# Patient Record
Sex: FEMALE | Race: BLACK OR AFRICAN AMERICAN | NOT HISPANIC OR LATINO | Employment: UNEMPLOYED | URBAN - METROPOLITAN AREA
[De-identification: names, ages, dates, MRNs, and addresses within clinical notes are randomized per-mention and may not be internally consistent; named-entity substitution may affect disease eponyms.]

---

## 2019-08-13 ENCOUNTER — HOSPITAL ENCOUNTER (EMERGENCY)
Dept: CARDIOLOGY | Facility: HOSPITAL | Age: 59
Discharge: HOME | End: 2019-08-13

## 2019-08-13 ENCOUNTER — APPOINTMENT (OUTPATIENT)
Dept: GENERAL RADIOLOGY | Facility: HOSPITAL | Age: 59
End: 2019-08-13

## 2019-08-13 ENCOUNTER — HOSPITAL ENCOUNTER (EMERGENCY)
Facility: HOSPITAL | Age: 59
Discharge: HOME | End: 2019-08-13
Admitting: EMERGENCY MEDICINE

## 2019-08-13 VITALS
WEIGHT: 251.83 LBS | BODY MASS INDEX: 44.62 KG/M2 | HEIGHT: 63 IN | DIASTOLIC BLOOD PRESSURE: 79 MMHG | SYSTOLIC BLOOD PRESSURE: 132 MMHG | TEMPERATURE: 98.2 F | HEART RATE: 78 BPM | OXYGEN SATURATION: 97 % | RESPIRATION RATE: 16 BRPM

## 2019-08-13 DIAGNOSIS — M79.605 LEFT LEG PAIN: Primary | ICD-10-CM

## 2019-08-13 LAB
BH CV LOWER VASCULAR LEFT COMMON FEMORAL AUGMENT: NORMAL
BH CV LOWER VASCULAR LEFT COMMON FEMORAL COMPETENT: NORMAL
BH CV LOWER VASCULAR LEFT COMMON FEMORAL COMPRESS: NORMAL
BH CV LOWER VASCULAR LEFT COMMON FEMORAL PHASIC: NORMAL
BH CV LOWER VASCULAR LEFT COMMON FEMORAL SPONT: NORMAL
BH CV LOWER VASCULAR LEFT DISTAL FEMORAL COMPRESS: NORMAL
BH CV LOWER VASCULAR LEFT GASTRONEMIUS COMPRESS: NORMAL
BH CV LOWER VASCULAR LEFT GREATER SAPH AK COMPRESS: NORMAL
BH CV LOWER VASCULAR LEFT GREATER SAPH BK COMPRESS: NORMAL
BH CV LOWER VASCULAR LEFT LESSER SAPH COMPRESS: NORMAL
BH CV LOWER VASCULAR LEFT MID FEMORAL AUGMENT: NORMAL
BH CV LOWER VASCULAR LEFT MID FEMORAL COMPETENT: NORMAL
BH CV LOWER VASCULAR LEFT MID FEMORAL COMPRESS: NORMAL
BH CV LOWER VASCULAR LEFT MID FEMORAL PHASIC: NORMAL
BH CV LOWER VASCULAR LEFT MID FEMORAL SPONT: NORMAL
BH CV LOWER VASCULAR LEFT PERONEAL COMPRESS: NORMAL
BH CV LOWER VASCULAR LEFT POPLITEAL AUGMENT: NORMAL
BH CV LOWER VASCULAR LEFT POPLITEAL COMPETENT: NORMAL
BH CV LOWER VASCULAR LEFT POPLITEAL COMPRESS: NORMAL
BH CV LOWER VASCULAR LEFT POPLITEAL PHASIC: NORMAL
BH CV LOWER VASCULAR LEFT POPLITEAL SPONT: NORMAL
BH CV LOWER VASCULAR LEFT POSTERIOR TIBIAL COMPRESS: NORMAL
BH CV LOWER VASCULAR LEFT PROXIMAL FEMORAL COMPRESS: NORMAL
BH CV LOWER VASCULAR LEFT SAPHENOFEMORAL JUNCTION COMPRESS: NORMAL
BH CV LOWER VASCULAR RIGHT COMMON FEMORAL AUGMENT: NORMAL
BH CV LOWER VASCULAR RIGHT COMMON FEMORAL COMPETENT: NORMAL
BH CV LOWER VASCULAR RIGHT COMMON FEMORAL COMPRESS: NORMAL
BH CV LOWER VASCULAR RIGHT COMMON FEMORAL PHASIC: NORMAL
BH CV LOWER VASCULAR RIGHT COMMON FEMORAL SPONT: NORMAL

## 2019-08-13 PROCEDURE — 73590 X-RAY EXAM OF LOWER LEG: CPT

## 2019-08-13 PROCEDURE — 99284 EMERGENCY DEPT VISIT MOD MDM: CPT

## 2019-08-13 PROCEDURE — 93971 EXTREMITY STUDY: CPT

## 2019-08-13 PROCEDURE — 73564 X-RAY EXAM KNEE 4 OR MORE: CPT

## 2019-08-13 RX ORDER — LEVOTHYROXINE SODIUM 0.1 MG/1
100 TABLET ORAL DAILY
COMMUNITY

## 2019-08-13 RX ORDER — IBUPROFEN 400 MG/1
800 TABLET ORAL ONCE
Status: COMPLETED | OUTPATIENT
Start: 2019-08-13 | End: 2019-08-13

## 2019-08-13 RX ADMIN — IBUPROFEN 800 MG: 400 TABLET ORAL at 08:58

## 2019-08-13 NOTE — DISCHARGE INSTRUCTIONS
Continue current home medications.  Use ibuprofen as needed for pain.  Ace wrap.  Follow-up with primary care physician.  Return for new or worsening symptoms.

## 2019-08-13 NOTE — ED PROVIDER NOTES
Subjective   Patient is a 58-year-old -American female with history of thyroid disorder who presents today with complaints of pain in her left lower leg.  She states this started approximately 2 weeks ago with no known injury or trauma.  She states that the majority of her pain is in the anterior aspect of her leg just below the knee.  She states she is also had some swelling to this area.  She also complains of some pain and tenderness along the inner aspect of her left leg and calf.  She denies any erythema.  Denies any numbness tingling or weakness.  She denies fever or chills.            Review of Systems   Constitutional: Negative for chills and fever.   Musculoskeletal:        Left lower leg pain and swelling   Neurological: Negative for weakness and numbness.       Past Medical History:   Diagnosis Date   • Cancer (CMS/HCC)     Ovarian Tumors removed       No Known Allergies    History reviewed. No pertinent surgical history.    History reviewed. No pertinent family history.    Social History     Socioeconomic History   • Marital status:      Spouse name: Not on file   • Number of children: Not on file   • Years of education: Not on file   • Highest education level: Not on file   Tobacco Use   • Smoking status: Never Smoker   Substance and Sexual Activity   • Alcohol use: No     Frequency: Never   • Drug use: No   • Sexual activity: Defer           Objective   Physical Exam   Constitutional: She appears well-developed.   Vital signs and triage nurse note reviewed.  Constitutional: Awake, alert; well-developed and well-nourished. No acute distress is noted.  Cardiovascular: Regular rate and rhythm  Pulmonary: Respiratory effort regular nonlabored  Musculoskeletal: Independent range of motion of all extremities.  There is tenderness noted over the anterior aspect of the left lower leg just below the knee.  There is mild edema noted.  There is no underlying erythema.  No open wound.  No crepitus.   There is also some tenderness noted along the inner aspect of the left lower leg and down to the calf.  There is no underlying erythema or edema.  Negative Hannah.  Distal neurovascular motor intact.  Neuro: Alert oriented x3, speech is clear and appropriate, GCS 15.    Skin: Flesh tone, warm, dry, intact; no erythematous or petechial rash or lesion.        Procedures           ED Course      Labs Reviewed - No data to display  Xr Knee 4+ View Left    Result Date: 8/13/2019   1. Mild soft tissue swelling anterior and lateral to the knee and lower leg, with no radiographic evidence of acute fracture or dislocation of the left knee or left lower leg. 2. 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  Electronically Signed By-Hemanth May On:8/13/2019 10:07 AM This report was finalized on [id number] by  Hemanth May, .    Xr Tibia Fibula 2 View Left    Result Date: 8/13/2019   1. Mild soft tissue swelling anterior and lateral to the knee and lower leg, with no radiographic evidence of acute fracture or dislocation of the left knee or left lower leg. 2. 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  Electronically Signed By-Hemanth May On:8/13/2019 10:07 AM This report was finalized on [id number] by  Hemanth May, .    Medications   ibuprofen (ADVIL,MOTRIN) tablet 800 mg (800 mg Oral Given 8/13/19 0858)                 MDM  Number of Diagnoses or Management Options  Left leg pain:      Amount and/or Complexity of Data Reviewed  Tests in the radiology section of CPT®: ordered and reviewed  Independent visualization of images, tracings, or specimens: yes    Risk of Complications, Morbidity, and/or Mortality  General comments: Comorbidities: Thyroid disorder  Differentials: Contusion,  Cyst, effusion, DVT;this list is not all inclusive and does not constitute the entirety of considered causes    Patient had  x-rays and venous Doppler obtained of the left leg that was found to be negative for DVT or SVT.  She is given ibuprofen for pain.    The patient had a Ace wrap applied.  Distal motor, sensory and vascular status intact after application.     Diagnosis and treatment plan discussed with patient.  Patient agreeable to plan.   I discussed findings with patient who voices understanding of discharge instructions, signs and symptoms requiring return to ED; discharged improved and in stable condition with follow up for re-evaluation.        Patient Progress  Patient progress: stable        Final diagnoses:   Left leg pain            Chelsea Maya APRN  08/13/19 1231       Chelsea Maya APRN  08/13/19 1238

## 2019-08-13 NOTE — ED NOTES
Pt c/o left knee pain starting 2 weeks ago. Pt denies any injury. Pulses are good upon assessment with no discoloration or noticeable edema. Knee is tender to touch.      Prior, RAYO Irving  08/13/19 0847

## 2019-08-19 ENCOUNTER — TRANSCRIBE ORDERS (OUTPATIENT)
Dept: ADMINISTRATIVE | Facility: HOSPITAL | Age: 59
End: 2019-08-19

## 2019-08-19 DIAGNOSIS — Z12.31 SCREENING MAMMOGRAM, ENCOUNTER FOR: Primary | ICD-10-CM

## 2019-08-27 ENCOUNTER — HOSPITAL ENCOUNTER (OUTPATIENT)
Dept: OTHER | Facility: HOSPITAL | Age: 59
Discharge: HOME | End: 2019-08-27

## 2019-08-27 DIAGNOSIS — Z00.6 EXAMINATION FOR NORMAL COMPARISON OR CONTROL IN CLINICAL RESEARCH: ICD-10-CM

## 2021-12-18 ENCOUNTER — HOSPITAL ENCOUNTER (EMERGENCY)
Facility: HOSPITAL | Age: 61
Discharge: HOME | End: 2021-12-18
Attending: EMERGENCY MEDICINE | Admitting: EMERGENCY MEDICINE

## 2021-12-18 ENCOUNTER — APPOINTMENT (OUTPATIENT)
Dept: GENERAL RADIOLOGY | Facility: HOSPITAL | Age: 61
End: 2021-12-18

## 2021-12-18 VITALS
HEIGHT: 64 IN | TEMPERATURE: 98.4 F | OXYGEN SATURATION: 95 % | RESPIRATION RATE: 16 BRPM | HEART RATE: 92 BPM | BODY MASS INDEX: 40.97 KG/M2 | SYSTOLIC BLOOD PRESSURE: 124 MMHG | DIASTOLIC BLOOD PRESSURE: 81 MMHG | WEIGHT: 240 LBS

## 2021-12-18 DIAGNOSIS — N39.0 URINARY TRACT INFECTION WITHOUT HEMATURIA, SITE UNSPECIFIED: ICD-10-CM

## 2021-12-18 DIAGNOSIS — J12.82 PNEUMONIA DUE TO COVID-19 VIRUS: Primary | ICD-10-CM

## 2021-12-18 DIAGNOSIS — U07.1 PNEUMONIA DUE TO COVID-19 VIRUS: Primary | ICD-10-CM

## 2021-12-18 LAB
ALBUMIN SERPL-MCNC: 3.4 G/DL (ref 3.5–5.2)
ALBUMIN/GLOB SERPL: 1 G/DL
ALP SERPL-CCNC: 74 U/L (ref 39–117)
ALT SERPL W P-5'-P-CCNC: 23 U/L (ref 1–33)
ANION GAP SERPL CALCULATED.3IONS-SCNC: 13 MMOL/L (ref 5–15)
AST SERPL-CCNC: 28 U/L (ref 1–32)
BACTERIA UR QL AUTO: ABNORMAL /HPF
BASOPHILS # BLD AUTO: 0 10*3/MM3 (ref 0–0.2)
BASOPHILS NFR BLD AUTO: 0.6 % (ref 0–1.5)
BILIRUB SERPL-MCNC: 0.5 MG/DL (ref 0–1.2)
BILIRUB UR QL STRIP: ABNORMAL
BUN SERPL-MCNC: 11 MG/DL (ref 8–23)
BUN/CREAT SERPL: 12.9 (ref 7–25)
CALCIUM SPEC-SCNC: 9 MG/DL (ref 8.6–10.5)
CHLORIDE SERPL-SCNC: 102 MMOL/L (ref 98–107)
CLARITY UR: ABNORMAL
CO2 SERPL-SCNC: 25 MMOL/L (ref 22–29)
COLOR UR: ABNORMAL
CREAT SERPL-MCNC: 0.85 MG/DL (ref 0.57–1)
D-LACTATE SERPL-SCNC: 1.4 MMOL/L (ref 0.5–2)
DEPRECATED RDW RBC AUTO: 44.2 FL (ref 37–54)
EOSINOPHIL # BLD AUTO: 0 10*3/MM3 (ref 0–0.4)
EOSINOPHIL NFR BLD AUTO: 0.6 % (ref 0.3–6.2)
ERYTHROCYTE [DISTWIDTH] IN BLOOD BY AUTOMATED COUNT: 13.1 % (ref 12.3–15.4)
FLUAV SUBTYP SPEC NAA+PROBE: NOT DETECTED
FLUBV RNA ISLT QL NAA+PROBE: NOT DETECTED
GFR SERPL CREATININE-BSD FRML MDRD: 82 ML/MIN/1.73
GLOBULIN UR ELPH-MCNC: 3.5 GM/DL
GLUCOSE SERPL-MCNC: 97 MG/DL (ref 65–99)
GLUCOSE UR STRIP-MCNC: NEGATIVE MG/DL
HCT VFR BLD AUTO: 39.3 % (ref 34–46.6)
HGB BLD-MCNC: 13.4 G/DL (ref 12–15.9)
HGB UR QL STRIP.AUTO: ABNORMAL
HOLD SPECIMEN: NORMAL
HYALINE CASTS UR QL AUTO: ABNORMAL /LPF
KETONES UR QL STRIP: ABNORMAL
LEUKOCYTE ESTERASE UR QL STRIP.AUTO: ABNORMAL
LIPASE SERPL-CCNC: 20 U/L (ref 13–60)
LYMPHOCYTES # BLD AUTO: 1.4 10*3/MM3 (ref 0.7–3.1)
LYMPHOCYTES NFR BLD AUTO: 21.3 % (ref 19.6–45.3)
MCH RBC QN AUTO: 32.8 PG (ref 26.6–33)
MCHC RBC AUTO-ENTMCNC: 34.2 G/DL (ref 31.5–35.7)
MCV RBC AUTO: 96.1 FL (ref 79–97)
MONOCYTES # BLD AUTO: 0.3 10*3/MM3 (ref 0.1–0.9)
MONOCYTES NFR BLD AUTO: 4.9 % (ref 5–12)
MUCOUS THREADS URNS QL MICRO: ABNORMAL /HPF
NEUTROPHILS NFR BLD AUTO: 4.7 10*3/MM3 (ref 1.7–7)
NEUTROPHILS NFR BLD AUTO: 72.6 % (ref 42.7–76)
NITRITE UR QL STRIP: NEGATIVE
NRBC BLD AUTO-RTO: 0 /100 WBC (ref 0–0.2)
PH UR STRIP.AUTO: 8.5 [PH] (ref 5–8)
PLATELET # BLD AUTO: 287 10*3/MM3 (ref 140–450)
PMV BLD AUTO: 7.7 FL (ref 6–12)
POTASSIUM SERPL-SCNC: 4.1 MMOL/L (ref 3.5–5.2)
PROT SERPL-MCNC: 6.9 G/DL (ref 6–8.5)
PROT UR QL STRIP: ABNORMAL
RBC # BLD AUTO: 4.08 10*6/MM3 (ref 3.77–5.28)
RBC # UR STRIP: ABNORMAL /HPF
REF LAB TEST METHOD: ABNORMAL
SARS-COV-2 RNA PNL SPEC NAA+PROBE: DETECTED
SODIUM SERPL-SCNC: 140 MMOL/L (ref 136–145)
SP GR UR STRIP: 1.02 (ref 1–1.03)
SQUAMOUS #/AREA URNS HPF: ABNORMAL /HPF
TRICHOMONAS #/AREA URNS HPF: ABNORMAL /HPF
UROBILINOGEN UR QL STRIP: ABNORMAL
WBC # UR STRIP: ABNORMAL /HPF
WBC NRBC COR # BLD: 6.5 10*3/MM3 (ref 3.4–10.8)
WHOLE BLOOD HOLD SPECIMEN: NORMAL

## 2021-12-18 PROCEDURE — 25010000002 CEFTRIAXONE PER 250 MG: Performed by: EMERGENCY MEDICINE

## 2021-12-18 PROCEDURE — 87635 SARS-COV-2 COVID-19 AMP PRB: CPT | Performed by: EMERGENCY MEDICINE

## 2021-12-18 PROCEDURE — 25010000002 ONDANSETRON PER 1 MG: Performed by: EMERGENCY MEDICINE

## 2021-12-18 PROCEDURE — 80053 COMPREHEN METABOLIC PANEL: CPT | Performed by: EMERGENCY MEDICINE

## 2021-12-18 PROCEDURE — 87040 BLOOD CULTURE FOR BACTERIA: CPT | Performed by: EMERGENCY MEDICINE

## 2021-12-18 PROCEDURE — 87086 URINE CULTURE/COLONY COUNT: CPT | Performed by: EMERGENCY MEDICINE

## 2021-12-18 PROCEDURE — 25010000002 INJECTION, CASIRIVIMAB AND IMDEVIMAB, 1200 MG: Performed by: EMERGENCY MEDICINE

## 2021-12-18 PROCEDURE — 85025 COMPLETE CBC W/AUTO DIFF WBC: CPT | Performed by: EMERGENCY MEDICINE

## 2021-12-18 PROCEDURE — 87502 INFLUENZA DNA AMP PROBE: CPT | Performed by: EMERGENCY MEDICINE

## 2021-12-18 PROCEDURE — 96374 THER/PROPH/DIAG INJ IV PUSH: CPT

## 2021-12-18 PROCEDURE — M0243 CASIRIVI AND IMDEVI INFUSION: HCPCS | Performed by: EMERGENCY MEDICINE

## 2021-12-18 PROCEDURE — 96375 TX/PRO/DX INJ NEW DRUG ADDON: CPT

## 2021-12-18 PROCEDURE — 71045 X-RAY EXAM CHEST 1 VIEW: CPT

## 2021-12-18 PROCEDURE — 83690 ASSAY OF LIPASE: CPT | Performed by: EMERGENCY MEDICINE

## 2021-12-18 PROCEDURE — 83605 ASSAY OF LACTIC ACID: CPT

## 2021-12-18 PROCEDURE — 99283 EMERGENCY DEPT VISIT LOW MDM: CPT

## 2021-12-18 PROCEDURE — 81001 URINALYSIS AUTO W/SCOPE: CPT | Performed by: EMERGENCY MEDICINE

## 2021-12-18 RX ORDER — ACETAMINOPHEN 500 MG
1000 TABLET ORAL ONCE
Status: COMPLETED | OUTPATIENT
Start: 2021-12-18 | End: 2021-12-18

## 2021-12-18 RX ORDER — ONDANSETRON 4 MG/1
4 TABLET, ORALLY DISINTEGRATING ORAL EVERY 6 HOURS PRN
Qty: 12 TABLET | Refills: 0 | OUTPATIENT
Start: 2021-12-18 | End: 2022-01-19

## 2021-12-18 RX ORDER — DIPHENHYDRAMINE HCL 25 MG
50 CAPSULE ORAL ONCE AS NEEDED
Status: DISCONTINUED | OUTPATIENT
Start: 2021-12-18 | End: 2021-12-18 | Stop reason: HOSPADM

## 2021-12-18 RX ORDER — METHYLPREDNISOLONE SODIUM SUCCINATE 125 MG/2ML
125 INJECTION, POWDER, LYOPHILIZED, FOR SOLUTION INTRAMUSCULAR; INTRAVENOUS ONCE AS NEEDED
Status: DISCONTINUED | OUTPATIENT
Start: 2021-12-18 | End: 2021-12-18 | Stop reason: HOSPADM

## 2021-12-18 RX ORDER — EPINEPHRINE 1 MG/ML
0.3 INJECTION, SOLUTION, CONCENTRATE INTRAVENOUS ONCE AS NEEDED
Status: DISCONTINUED | OUTPATIENT
Start: 2021-12-18 | End: 2021-12-18 | Stop reason: HOSPADM

## 2021-12-18 RX ORDER — CEFDINIR 300 MG/1
300 CAPSULE ORAL 2 TIMES DAILY
Qty: 14 CAPSULE | Refills: 0 | OUTPATIENT
Start: 2021-12-18 | End: 2022-01-19

## 2021-12-18 RX ORDER — DIPHENHYDRAMINE HYDROCHLORIDE 50 MG/ML
50 INJECTION INTRAMUSCULAR; INTRAVENOUS ONCE AS NEEDED
Status: DISCONTINUED | OUTPATIENT
Start: 2021-12-18 | End: 2021-12-18 | Stop reason: HOSPADM

## 2021-12-18 RX ORDER — SODIUM CHLORIDE 0.9 % (FLUSH) 0.9 %
10 SYRINGE (ML) INJECTION AS NEEDED
Status: DISCONTINUED | OUTPATIENT
Start: 2021-12-18 | End: 2021-12-18 | Stop reason: HOSPADM

## 2021-12-18 RX ORDER — ONDANSETRON 2 MG/ML
4 INJECTION INTRAMUSCULAR; INTRAVENOUS ONCE
Status: COMPLETED | OUTPATIENT
Start: 2021-12-18 | End: 2021-12-18

## 2021-12-18 RX ORDER — SODIUM CHLORIDE 9 MG/ML
30 INJECTION, SOLUTION INTRAVENOUS ONCE
Status: COMPLETED | OUTPATIENT
Start: 2021-12-18 | End: 2021-12-18

## 2021-12-18 RX ADMIN — WATER 2 G: 100 INJECTION, SOLUTION INTRAVENOUS at 19:32

## 2021-12-18 RX ADMIN — ONDANSETRON 4 MG: 2 INJECTION INTRAMUSCULAR; INTRAVENOUS at 17:06

## 2021-12-18 RX ADMIN — IMDEVIMAB: 1332 INJECTION, SOLUTION, CONCENTRATE INTRAVENOUS at 19:55

## 2021-12-18 RX ADMIN — SODIUM CHLORIDE, POTASSIUM CHLORIDE, SODIUM LACTATE AND CALCIUM CHLORIDE 1000 ML: 600; 310; 30; 20 INJECTION, SOLUTION INTRAVENOUS at 17:12

## 2021-12-18 RX ADMIN — ACETAMINOPHEN 1000 MG: 500 TABLET, FILM COATED ORAL at 17:06

## 2021-12-18 RX ADMIN — SODIUM CHLORIDE 30 ML: 9 INJECTION, SOLUTION INTRAVENOUS at 20:14

## 2021-12-18 NOTE — ED PROVIDER NOTES
Subjective   Chief complaint headache earache cough congestion nausea vomiting    History present 61-year-old female who states that she has had about a 5-day history of some headache ear ache congestion sore throat cough and over last 24 hours and started vomiting and diarrhea.  Patient with low-grade temperature and she has had chills.  No other ill exposures or foreign travels antibiotic use or recent hospitalization.  No dysuria or frequency.  Patient no leg pain or swelling no rashes.  Nothing makes it better nothing makes it worse continuous 5 days associated above symptoms.  Patient is not vaccinated against Covid          Review of Systems   Constitutional: Positive for chills and fever.   HENT: Positive for congestion and ear pain.    Eyes: Negative for photophobia and visual disturbance.   Respiratory: Positive for cough. Negative for chest tightness and shortness of breath.    Cardiovascular: Negative for chest pain and leg swelling.   Gastrointestinal: Positive for vomiting. Negative for abdominal pain.   Endocrine: Negative for cold intolerance and heat intolerance.   Genitourinary: Negative for difficulty urinating and dysuria.   Musculoskeletal: Positive for myalgias. Negative for arthralgias and back pain.   Skin: Negative for color change and rash.   Neurological: Positive for weakness. Negative for dizziness and light-headedness.   Psychiatric/Behavioral: Negative for agitation and behavioral problems.       Past Medical History:   Diagnosis Date   • Cancer (CMS/HCC)     Ovarian Tumors removed       No Known Allergies    No past surgical history on file.    No family history on file.    Social History     Socioeconomic History   • Marital status:    Tobacco Use   • Smoking status: Never Smoker   Substance and Sexual Activity   • Alcohol use: No   • Drug use: No   • Sexual activity: Defer     Patient takes no medication      Objective   Physical Exam  61-year-old awake alert no acute distress  temperature 100.7 sats were 97% on room air HEENT extraocular muscles are intact pupils equal round react there is no photophobia TMs are clear the mouth is clear no exudate no abscess stridor drooling tongue floor mouth normal neck supple no adenopathy no meningeal signs no JVD lungs rhonchi in the bases no retractions heart regular without murmur abdomen soft without tenderness good bowel sounds no peritoneal findings or masses extremities pulses equal throughout upper and lower extremities no edema cords or Homans' sign or evidence of DVT skin warm dry without rashes or cellulitic change neurologic awake alert follows commands motor strength normal without focal weakness  Procedures           ED Course      Results for orders placed or performed during the hospital encounter of 12/18/21   COVID-19,CEPHEID/JANNETTE,COR/ALLISON/PAD/EVELINA IN-HOUSE(OR EMERGENT/ADD-ON),NP SWAB IN TRANSPORT MEDIA 3-4 HR TAT, RT-PCR - Swab, Nasopharynx    Specimen: Nasopharynx; Swab   Result Value Ref Range    COVID19 Detected (C) Not Detected - Ref. Range   Influenza Antigen, Rapid - Swab, Nasopharynx    Specimen: Nasopharynx; Swab   Result Value Ref Range    Influenza A PCR Not Detected Not Detected    Influenza B PCR Not Detected Not Detected   Comprehensive Metabolic Panel    Specimen: Blood   Result Value Ref Range    Glucose 97 65 - 99 mg/dL    BUN 11 8 - 23 mg/dL    Creatinine 0.85 0.57 - 1.00 mg/dL    Sodium 140 136 - 145 mmol/L    Potassium 4.1 3.5 - 5.2 mmol/L    Chloride 102 98 - 107 mmol/L    CO2 25.0 22.0 - 29.0 mmol/L    Calcium 9.0 8.6 - 10.5 mg/dL    Total Protein 6.9 6.0 - 8.5 g/dL    Albumin 3.40 (L) 3.50 - 5.20 g/dL    ALT (SGPT) 23 1 - 33 U/L    AST (SGOT) 28 1 - 32 U/L    Alkaline Phosphatase 74 39 - 117 U/L    Total Bilirubin 0.5 0.0 - 1.2 mg/dL    eGFR  African Amer 82 >60 mL/min/1.73    Globulin 3.5 gm/dL    A/G Ratio 1.0 g/dL    BUN/Creatinine Ratio 12.9 7.0 - 25.0    Anion Gap 13.0 5.0 - 15.0 mmol/L   Lipase    Specimen:  Blood   Result Value Ref Range    Lipase 20 13 - 60 U/L   Urinalysis With Microscopic If Indicated (No Culture) - Urine, Clean Catch    Specimen: Urine, Clean Catch   Result Value Ref Range    Color, UA Dark Yellow (A) Yellow, Straw    Appearance, UA Cloudy (A) Clear    pH, UA 8.5 (H) 5.0 - 8.0    Specific Gravity, UA 1.020 1.005 - 1.030    Glucose, UA Negative Negative    Ketones, UA 80 mg/dL (3+) (A) Negative    Bilirubin, UA Small (1+) (A) Negative    Blood, UA Trace (A) Negative    Protein,  mg/dL (2+) (A) Negative    Leuk Esterase, UA Large (3+) (A) Negative    Nitrite, UA Negative Negative    Urobilinogen, UA 2.0 E.U./dL (A) 0.2 - 1.0 E.U./dL   CBC Auto Differential    Specimen: Blood   Result Value Ref Range    WBC 6.50 3.40 - 10.80 10*3/mm3    RBC 4.08 3.77 - 5.28 10*6/mm3    Hemoglobin 13.4 12.0 - 15.9 g/dL    Hematocrit 39.3 34.0 - 46.6 %    MCV 96.1 79.0 - 97.0 fL    MCH 32.8 26.6 - 33.0 pg    MCHC 34.2 31.5 - 35.7 g/dL    RDW 13.1 12.3 - 15.4 %    RDW-SD 44.2 37.0 - 54.0 fl    MPV 7.7 6.0 - 12.0 fL    Platelets 287 140 - 450 10*3/mm3    Neutrophil % 72.6 42.7 - 76.0 %    Lymphocyte % 21.3 19.6 - 45.3 %    Monocyte % 4.9 (L) 5.0 - 12.0 %    Eosinophil % 0.6 0.3 - 6.2 %    Basophil % 0.6 0.0 - 1.5 %    Neutrophils, Absolute 4.70 1.70 - 7.00 10*3/mm3    Lymphocytes, Absolute 1.40 0.70 - 3.10 10*3/mm3    Monocytes, Absolute 0.30 0.10 - 0.90 10*3/mm3    Eosinophils, Absolute 0.00 0.00 - 0.40 10*3/mm3    Basophils, Absolute 0.00 0.00 - 0.20 10*3/mm3    nRBC 0.0 0.0 - 0.2 /100 WBC   Urinalysis, Microscopic Only - Urine, Clean Catch    Specimen: Urine, Clean Catch   Result Value Ref Range    RBC, UA 0-2 (A) None Seen /HPF    WBC, UA Too Numerous to Count (A) None Seen /HPF    Bacteria, UA 2+ (A) None Seen /HPF    Squamous Epithelial Cells, UA 13-20 (A) None Seen, 0-2 /HPF    Hyaline Casts, UA 0-2 None Seen /LPF    Mucus, UA Trace None Seen, Trace /HPF    Trichomonas, UA Large/3+ (A) None Seen /HPF     Methodology Automated Microscopy    POC Lactate    Specimen: Blood   Result Value Ref Range    Lactate 1.4 0.5 - 2.0 mmol/L   Gold Top - SST   Result Value Ref Range    Extra Tube Hold for add-ons.    Light Blue Top   Result Value Ref Range    Extra Tube hold for add-on      XR Chest 1 View    Result Date: 12/18/2021   1. Questionable small focal areas of infiltrate in the left lower lobe along.  Electronically Signed By-Joon Crump MD On:12/18/2021 5:25 PM This report was finalized on [id number] by  Joon Crump MD.    Medications   sodium chloride 0.9 % flush 10 mL (has no administration in time range)   EPINEPHrine PF (ADRENALIN) injection 0.3 mg (has no administration in time range)   diphenhydrAMINE (BENADRYL) capsule 50 mg (has no administration in time range)     Or   diphenhydrAMINE (BENADRYL) injection 50 mg (has no administration in time range)   methylPREDNISolone sodium succinate (SOLU-Medrol) injection 125 mg (has no administration in time range)   lactated ringers bolus 1,000 mL (0 mL Intravenous Stopped 12/18/21 2000)   ondansetron (ZOFRAN) injection 4 mg (4 mg Intravenous Given 12/18/21 1706)   acetaminophen (TYLENOL) tablet 1,000 mg (1,000 mg Oral Given 12/18/21 1706)   cefTRIAXone (ROCEPHIN) in SWFI 2 GRAMS/20ml IV PUSH syringe (2 g Intravenous Given 12/18/21 1932)   INV casirivimab / imdevimab 1200 mg in 60 mL NS IVPB (premix) ( Intravenous Stopped 12/18/21 2014)   sodium chloride 0.9 % infusion 30 mL (0 mL Intravenous Stopped 12/18/21 2027)           The FDA has authorized the emergency use of casirivimab and imdevimab, which is not an FDA approved drug. Discussions with the patient regarding the risks and benefits of casirivimab and imdevimab have occurred. The patient recognizes that this is an investigational treatment which may offer significant known and potential benefits and risks, the extent of which are unknown. Information on available alternative treatments and the  risks and benefits of those alternatives was discussed. The patient received the “Fact Sheet for Patients Parents and Caregivers”. All questions from the patient were answered to satisfaction. The patient has the option to accept or refuse treatment with casirivimab and imdevimab and would like to accept treatment.    Counseling regarding continued self isolation and use of infection control measures according to the CDC guidelines has occurred.                                        MDM  Number of Diagnoses or Management Options  Pneumonia due to COVID-19 virus: new and requires workup  Urinary tract infection without hematuria, site unspecified: new and requires workup  Diagnosis management comments: Medical decision made.  Patient had IV established placed on a monitor liter lactated Ringer's given a gram of Tylenol p.o. and Zofran 4 mg IV and had the above evaluation.  Chest x-ray with some questionable small patchy pneumonia in the left lower lung.  Patient had chemistries otherwise unremarkable flu was negative COVID-19 was positive lipase normal urine showed some ketones in it she has large leukocyte tenderness count white cells and 2+ bacteria we did add a culture that lactate was 1.4.  Patient's CBC was normal.  Culture is pending.  Patient's oxygen saturations have been anywhere from 96% to 98%.  She has not been hypoxic.  She has respiratory of 18 potential temperature issues 100.7 she was given Tylenol for that her heart rate is now down to 90 on my exam.  Patient is not appropriate for antibiotic therapy if she wishes.  Given gram Rocephin IV for her urine.  We talked about outpatient versus inpatient risk benefits complications alternative treatments and after shared medical decision making patient states she rather go home and she gets worse she will return.  She was agreeable to antibiotic therapy as risk benefits complications explained and she voiced understanding.  She was agreeable to antibiotic  therapy and that will be provided.  Patient be placed on Omnicef at home she will quarantine at home she will return if she has any other worsening problems recurrent we talked about what to return for extensively.  Stable otherwise unremarkable ER course.  She had no further vomiting she was placed on Omnicef and Zofran at the house       Amount and/or Complexity of Data Reviewed  Clinical lab tests: reviewed  Tests in the radiology section of CPT®: reviewed    Risk of Complications, Morbidity, and/or Mortality  Presenting problems: high  Diagnostic procedures: high  Management options: high    Patient Progress  Patient progress: stable      Final diagnoses:   Pneumonia due to COVID-19 virus   Urinary tract infection without hematuria, site unspecified       ED Disposition  ED Disposition     ED Disposition Condition Comment    Discharge Stable           Paola Dodson MD  [address]  [facility] [address]  [place] [address]  [phone]    In 3 days           Medication List      New Prescriptions    cefdinir 300 MG capsule  Commonly known as: OMNICEF  Take 1 capsule by mouth 2 (Two) Times a Day.     ondansetron ODT 4 MG disintegrating tablet  Commonly known as: ZOFRAN-ODT  Place 1 tablet on the tongue Every 6 (Six) Hours As Needed for Vomiting.           Where to Get Your Medications      These medications were sent to [id number] - [place], [place] - [address] [facility] - [phone] [facility] [phone] FX  [address] [place] [address]    Phone: [phone]   · cefdinir 300 MG capsule  · ondansetron ODT 4 MG disintegrating tablet          Artie Rolon MD  12/18/21 2310       Artie Rolon MD  12/18/21 3851

## 2021-12-18 NOTE — ED NOTES
Patient presents to the ED with complaints of diarrhea, nausea, vomiting, congestion, ear pain, body aches, and a cough x 6days     Jada Mehta RN  12/18/21 4841

## 2021-12-19 LAB — BACTERIA SPEC AEROBE CULT: NORMAL

## 2021-12-19 NOTE — DISCHARGE INSTRUCTIONS
Rest plenty fluids follow-up with your doctor 3 days follow-up urine culture.  Omnicef sent to your pharmacy.  Zofran sent to your pharmacy  Quarantine  Return for increasing shortness of breath decreasing oxygenation fevers uncontrolled vomiting cannot hold anything down or any other new or worse problems or concerns.

## 2021-12-23 LAB
BACTERIA SPEC AEROBE CULT: NORMAL
BACTERIA SPEC AEROBE CULT: NORMAL

## 2022-01-19 ENCOUNTER — HOSPITAL ENCOUNTER (EMERGENCY)
Facility: HOSPITAL | Age: 62
Discharge: HOME | End: 2022-01-19
Attending: NURSE PRACTITIONER | Admitting: EMERGENCY MEDICINE

## 2022-01-19 VITALS
OXYGEN SATURATION: 99 % | WEIGHT: 251.32 LBS | RESPIRATION RATE: 16 BRPM | HEART RATE: 72 BPM | TEMPERATURE: 98.1 F | BODY MASS INDEX: 42.91 KG/M2 | DIASTOLIC BLOOD PRESSURE: 88 MMHG | SYSTOLIC BLOOD PRESSURE: 134 MMHG | HEIGHT: 64 IN

## 2022-01-19 DIAGNOSIS — M54.32 SCIATICA OF LEFT SIDE: Primary | ICD-10-CM

## 2022-01-19 PROCEDURE — 99283 EMERGENCY DEPT VISIT LOW MDM: CPT

## 2022-01-19 PROCEDURE — 96372 THER/PROPH/DIAG INJ SC/IM: CPT

## 2022-01-19 PROCEDURE — 25010000002 KETOROLAC TROMETHAMINE PER 15 MG: Performed by: NURSE PRACTITIONER

## 2022-01-19 RX ORDER — CYCLOBENZAPRINE HCL 10 MG
10 TABLET ORAL 3 TIMES DAILY PRN
Qty: 15 TABLET | Refills: 0 | Status: SHIPPED | OUTPATIENT
Start: 2022-01-19

## 2022-01-19 RX ORDER — IBUPROFEN 600 MG/1
600 TABLET ORAL EVERY 6 HOURS PRN
Qty: 30 TABLET | Refills: 0 | Status: SHIPPED | OUTPATIENT
Start: 2022-01-19

## 2022-01-19 RX ORDER — KETOROLAC TROMETHAMINE 30 MG/ML
60 INJECTION, SOLUTION INTRAMUSCULAR; INTRAVENOUS ONCE
Status: COMPLETED | OUTPATIENT
Start: 2022-01-19 | End: 2022-01-19

## 2022-01-19 RX ORDER — CYCLOBENZAPRINE HCL 10 MG
10 TABLET ORAL ONCE
Status: COMPLETED | OUTPATIENT
Start: 2022-01-19 | End: 2022-01-19

## 2022-01-19 RX ADMIN — KETOROLAC TROMETHAMINE 60 MG: 30 INJECTION, SOLUTION INTRAMUSCULAR at 08:19

## 2022-01-19 RX ADMIN — CYCLOBENZAPRINE 10 MG: 10 TABLET, FILM COATED ORAL at 08:19

## 2022-01-19 NOTE — ED NOTES
Patient complains of low back pain that radiates down the left leg and hip, upon movement patient complains of pain hurting worse.     Josiane He RN  01/19/22 0837

## 2022-01-19 NOTE — DISCHARGE INSTRUCTIONS
Take the ibuprofen and cyclobenzaprine as needed for pain.  Do not take a dose of ibuprofen until tomorrow morning.  You may also take Tylenol as needed.  Return to the emergency department should you develop weakness in your legs, loss of bowel or bladder control, or any other new or worsening symptoms.  Otherwise call today to schedule a follow-up with your primary care provider.

## 2022-01-19 NOTE — ED PROVIDER NOTES
Subjective   History:    61-year-old female presents the emergency department today with complaints of left thigh pain.  Patient reports it feels like the last time she had sciatica many years ago.  Patient reports that the pain originates in the low back and radiates from there to her left hip and down into the left thigh.  Patient has no known injury.  She has not tried any medications at home to help with the pain.  Patient denies any weakness/numbness/tingling in her legs, loss of bowel or bladder control.    Onset: 1 week  Location: Left thigh  Duration: Continuous  Character: Sharp  Aggravating/Alleviating factors: Exacerbated with palpation or movement, no identified alleviating factors  Radiation: Actually originates in low back and radiates from there to left hip and down into the left lateral thigh, pain is most intense in the left thigh  Severity: Moderate            Review of Systems   Constitutional: Negative for appetite change, chills, fatigue and fever.   HENT: Negative for congestion, facial swelling, sinus pain and sore throat.    Eyes: Negative for pain and visual disturbance.   Respiratory: Negative for cough, chest tightness and shortness of breath.    Cardiovascular: Negative for chest pain and palpitations.   Gastrointestinal: Negative for constipation, diarrhea, nausea and vomiting.   Genitourinary: Negative for dysuria, flank pain, frequency and urgency.   Musculoskeletal: Positive for back pain. Negative for arthralgias, joint swelling and neck pain.   Skin: Negative for color change and rash.   Neurological: Negative for dizziness, seizures, syncope, weakness, light-headedness and headaches.       Past Medical History:   Diagnosis Date   • Cancer (CMS/HCC)     Ovarian Tumors removed       No Known Allergies    No past surgical history on file.    No family history on file.    Social History     Socioeconomic History   • Marital status:    Tobacco Use   • Smoking status: Never Smoker    Substance and Sexual Activity   • Alcohol use: No   • Drug use: No   • Sexual activity: Defer           Objective   Physical Exam  Constitutional:       General: She is not in acute distress.     Appearance: She is obese.   HENT:      Head: Normocephalic and atraumatic.      Nose: Nose normal.   Eyes:      Extraocular Movements: Extraocular movements intact.      Conjunctiva/sclera: Conjunctivae normal.      Pupils: Pupils are equal, round, and reactive to light.   Cardiovascular:      Rate and Rhythm: Normal rate and regular rhythm.      Pulses: Normal pulses.      Heart sounds: Normal heart sounds.   Pulmonary:      Effort: Pulmonary effort is normal.      Breath sounds: Normal breath sounds.   Abdominal:      General: Bowel sounds are normal. There is no distension.      Palpations: Abdomen is soft.      Tenderness: There is no abdominal tenderness. There is no guarding.   Musculoskeletal:         General: No tenderness or signs of injury.      Cervical back: Normal range of motion and neck supple.      Lumbar back: No swelling, deformity, spasms, tenderness or bony tenderness. Normal range of motion. Positive left straight leg raise test. Negative right straight leg raise test.      Right lower leg: No edema.      Left lower leg: No edema.   Skin:     General: Skin is warm and dry.      Capillary Refill: Capillary refill takes less than 2 seconds.   Neurological:      Mental Status: She is alert and oriented to person, place, and time.      Cranial Nerves: No cranial nerve deficit.      Motor: No weakness.      Coordination: Coordination is intact.      Gait: Gait normal.      Deep Tendon Reflexes:      Reflex Scores:       Patellar reflexes are 2+ on the right side and 2+ on the left side.       Achilles reflexes are 2+ on the right side and 2+ on the left side.  Psychiatric:         Mood and Affect: Mood normal.         Behavior: Behavior normal.         Thought Content: Thought content normal.          Judgment: Judgment normal.         Procedures           ED Course      Medications   ketorolac (TORADOL) injection 60 mg (has no administration in time range)   cyclobenzaprine (FLEXERIL) tablet 10 mg (has no administration in time range)     Labs Reviewed - No data to display  No orders to display                                                  MDM  Number of Diagnoses or Management Options  Sciatica of left side  Diagnosis management comments: I examined the patient using the appropriate personal protective equipment.      DISPOSITION:   Chart Review:  Comorbidity:  has a past medical history of Cancer (CMS/HCC).      Imaging: Was interpreted by physician and reviewed by myself:  No radiology results for the last day    Disposition/Treatment:    61-year-old female presented to the emergency department today with a chief complaint of left thigh pain.  However on further evaluation patient reports that the pain originates in the low back and radiates from there to the left hip and down into the left thigh.  Is consistent with previously experienced sciatica.  Patient had no known injury and denied radicular symptoms or loss of bowel or bladder control.  Patient was given cyclobenzaprine and Toradol in the emergency department.  Discussed with patient this is likely recurrence of her sciatica.  Advised to follow-up with her primary care provider for further evaluation and possible referral to physical therapy.  Will be discharged home in stable condition with prescriptions for ibuprofen 600 mg and cyclobenzaprine.  Will return to the emergency department for any new or worsening symptoms.  Patient is in agreement with plan.      Final diagnoses:   Sciatica of left side       ED Disposition  ED Disposition     ED Disposition Condition Comment    Discharge Stable           Paola Dodson MD  [address]  [place] [address]  [facility] [address]  [phone]    Call today  To schedule follow up         Medication  List      New Prescriptions    cyclobenzaprine 10 MG tablet  Commonly known as: FLEXERIL  Take 1 tablet by mouth 3 (Three) Times a Day As Needed for Muscle Spasms for up to 15 doses.     ibuprofen 600 MG tablet  Commonly known as: ADVIL,MOTRIN  Take 1 tablet by mouth Every 6 (Six) Hours As Needed for Mild Pain .        Stop    cefdinir 300 MG capsule  Commonly known as: OMNICEF     ondansetron ODT 4 MG disintegrating tablet  Commonly known as: ZOFRAN-ODT           Where to Get Your Medications      These medications were sent to [id number] - [place], [place] - [address] AT SEC OF [facility] [address] - [phone]  - [phone] FX  [address] [facility] [place] [address]    Phone: [phone]   · cyclobenzaprine 10 MG tablet  · ibuprofen 600 MG tablet          Nevaeh Baker, EVANGELIST  01/19/22 0878

## 2022-11-14 ENCOUNTER — APPOINTMENT (OUTPATIENT)
Dept: CT IMAGING | Facility: HOSPITAL | Age: 62
End: 2022-11-14

## 2022-11-14 ENCOUNTER — HOSPITAL ENCOUNTER (EMERGENCY)
Facility: HOSPITAL | Age: 62
Discharge: HOME | End: 2022-11-14
Attending: EMERGENCY MEDICINE | Admitting: EMERGENCY MEDICINE

## 2022-11-14 ENCOUNTER — APPOINTMENT (OUTPATIENT)
Dept: GENERAL RADIOLOGY | Facility: HOSPITAL | Age: 62
End: 2022-11-14

## 2022-11-14 VITALS
DIASTOLIC BLOOD PRESSURE: 96 MMHG | HEIGHT: 64 IN | WEIGHT: 260.8 LBS | OXYGEN SATURATION: 90 % | BODY MASS INDEX: 44.53 KG/M2 | RESPIRATION RATE: 19 BRPM | TEMPERATURE: 97.8 F | SYSTOLIC BLOOD PRESSURE: 163 MMHG | HEART RATE: 81 BPM

## 2022-11-14 DIAGNOSIS — R03.0 ELEVATED BLOOD PRESSURE READING: ICD-10-CM

## 2022-11-14 DIAGNOSIS — J21.1 ACUTE BRONCHIOLITIS DUE TO HUMAN METAPNEUMOVIRUS: ICD-10-CM

## 2022-11-14 DIAGNOSIS — R50.9 SUBJECTIVE FEVER: ICD-10-CM

## 2022-11-14 DIAGNOSIS — H92.03 OTALGIA OF BOTH EARS: ICD-10-CM

## 2022-11-14 DIAGNOSIS — R05.1 ACUTE COUGH: Primary | ICD-10-CM

## 2022-11-14 DIAGNOSIS — J01.90 ACUTE SINUSITIS, RECURRENCE NOT SPECIFIED, UNSPECIFIED LOCATION: ICD-10-CM

## 2022-11-14 LAB
ALBUMIN SERPL-MCNC: 3.9 G/DL (ref 3.5–5.2)
ALBUMIN/GLOB SERPL: 1.1 G/DL
ALP SERPL-CCNC: 100 U/L (ref 39–117)
ALT SERPL W P-5'-P-CCNC: 21 U/L (ref 1–33)
ANION GAP SERPL CALCULATED.3IONS-SCNC: 15 MMOL/L (ref 5–15)
ARTERIAL PATENCY WRIST A: POSITIVE
AST SERPL-CCNC: 23 U/L (ref 1–32)
ATMOSPHERIC PRESS: ABNORMAL MM[HG]
B PARAPERT DNA SPEC QL NAA+PROBE: NOT DETECTED
B PERT DNA SPEC QL NAA+PROBE: NOT DETECTED
BASE EXCESS BLDA CALC-SCNC: 1.7 MMOL/L (ref 0–3)
BASOPHILS # BLD AUTO: 0.1 10*3/MM3 (ref 0–0.2)
BASOPHILS NFR BLD AUTO: 0.7 % (ref 0–1.5)
BDY SITE: ABNORMAL
BILIRUB SERPL-MCNC: 0.6 MG/DL (ref 0–1.2)
BUN SERPL-MCNC: 10 MG/DL (ref 8–23)
BUN/CREAT SERPL: 10.4 (ref 7–25)
C PNEUM DNA NPH QL NAA+NON-PROBE: NOT DETECTED
CALCIUM SPEC-SCNC: 9.1 MG/DL (ref 8.6–10.5)
CHLORIDE SERPL-SCNC: 101 MMOL/L (ref 98–107)
CO2 BLDA-SCNC: 27.6 MMOL/L (ref 22–29)
CO2 SERPL-SCNC: 21 MMOL/L (ref 22–29)
CREAT SERPL-MCNC: 0.96 MG/DL (ref 0.57–1)
CRP SERPL-MCNC: 4.74 MG/DL (ref 0–0.5)
D DIMER PPP FEU-MCNC: 0.8 MG/L (FEU) (ref 0–0.59)
D-LACTATE SERPL-SCNC: 0.7 MMOL/L (ref 0.5–2)
DEPRECATED RDW RBC AUTO: 47.3 FL (ref 37–54)
EGFRCR SERPLBLD CKD-EPI 2021: 66.2 ML/MIN/1.73
EOSINOPHIL # BLD AUTO: 0.3 10*3/MM3 (ref 0–0.4)
EOSINOPHIL NFR BLD AUTO: 3.7 % (ref 0.3–6.2)
ERYTHROCYTE [DISTWIDTH] IN BLOOD BY AUTOMATED COUNT: 13.2 % (ref 12.3–15.4)
ERYTHROCYTE [SEDIMENTATION RATE] IN BLOOD: 40 MM/HR (ref 0–30)
FLUAV SUBTYP SPEC NAA+PROBE: NOT DETECTED
FLUBV RNA ISLT QL NAA+PROBE: NOT DETECTED
GLOBULIN UR ELPH-MCNC: 3.5 GM/DL
GLUCOSE SERPL-MCNC: 113 MG/DL (ref 65–99)
HADV DNA SPEC NAA+PROBE: NOT DETECTED
HCO3 BLDA-SCNC: 26.3 MMOL/L (ref 21–28)
HCOV 229E RNA SPEC QL NAA+PROBE: NOT DETECTED
HCOV HKU1 RNA SPEC QL NAA+PROBE: NOT DETECTED
HCOV NL63 RNA SPEC QL NAA+PROBE: NOT DETECTED
HCOV OC43 RNA SPEC QL NAA+PROBE: NOT DETECTED
HCT VFR BLD AUTO: 44.2 % (ref 34–46.6)
HEMODILUTION: NO
HGB BLD-MCNC: 14.7 G/DL (ref 12–15.9)
HMPV RNA NPH QL NAA+NON-PROBE: DETECTED
HOLD SPECIMEN: NORMAL
HPIV1 RNA ISLT QL NAA+PROBE: NOT DETECTED
HPIV2 RNA SPEC QL NAA+PROBE: NOT DETECTED
HPIV3 RNA NPH QL NAA+PROBE: NOT DETECTED
HPIV4 P GENE NPH QL NAA+PROBE: NOT DETECTED
INHALED O2 CONCENTRATION: 28 %
LYMPHOCYTES # BLD AUTO: 2.4 10*3/MM3 (ref 0.7–3.1)
LYMPHOCYTES NFR BLD AUTO: 31.3 % (ref 19.6–45.3)
M PNEUMO IGG SER IA-ACNC: NOT DETECTED
MCH RBC QN AUTO: 32.1 PG (ref 26.6–33)
MCHC RBC AUTO-ENTMCNC: 33.3 G/DL (ref 31.5–35.7)
MCV RBC AUTO: 96.2 FL (ref 79–97)
MODALITY: ABNORMAL
MONOCYTES # BLD AUTO: 0.7 10*3/MM3 (ref 0.1–0.9)
MONOCYTES NFR BLD AUTO: 9.5 % (ref 5–12)
NEUTROPHILS NFR BLD AUTO: 4.3 10*3/MM3 (ref 1.7–7)
NEUTROPHILS NFR BLD AUTO: 54.8 % (ref 42.7–76)
NRBC BLD AUTO-RTO: 0.1 /100 WBC (ref 0–0.2)
NT-PROBNP SERPL-MCNC: 15.2 PG/ML (ref 0–900)
PCO2 BLDA: 40.5 MM HG (ref 35–48)
PH BLDA: 7.42 PH UNITS (ref 7.35–7.45)
PLATELET # BLD AUTO: 290 10*3/MM3 (ref 140–450)
PMV BLD AUTO: 8 FL (ref 6–12)
PO2 BLDA: 144.1 MM HG (ref 83–108)
POTASSIUM SERPL-SCNC: 4.5 MMOL/L (ref 3.5–5.2)
PROT SERPL-MCNC: 7.4 G/DL (ref 6–8.5)
RBC # BLD AUTO: 4.59 10*6/MM3 (ref 3.77–5.28)
RHINOVIRUS RNA SPEC NAA+PROBE: NOT DETECTED
RSV RNA NPH QL NAA+NON-PROBE: NOT DETECTED
S PYO AG THROAT QL: NEGATIVE
SAO2 % BLDCOA: 99.3 % (ref 94–98)
SARS-COV-2 RNA NPH QL NAA+NON-PROBE: NOT DETECTED
SODIUM SERPL-SCNC: 137 MMOL/L (ref 136–145)
TROPONIN T SERPL-MCNC: <0.01 NG/ML (ref 0–0.03)
WBC NRBC COR # BLD: 7.8 10*3/MM3 (ref 3.4–10.8)
WHOLE BLOOD HOLD COAG: NORMAL
WHOLE BLOOD HOLD SPECIMEN: NORMAL

## 2022-11-14 PROCEDURE — 85652 RBC SED RATE AUTOMATED: CPT | Performed by: NURSE PRACTITIONER

## 2022-11-14 PROCEDURE — 0 IOPAMIDOL PER 1 ML: Performed by: EMERGENCY MEDICINE

## 2022-11-14 PROCEDURE — 71045 X-RAY EXAM CHEST 1 VIEW: CPT

## 2022-11-14 PROCEDURE — 86140 C-REACTIVE PROTEIN: CPT | Performed by: NURSE PRACTITIONER

## 2022-11-14 PROCEDURE — 71275 CT ANGIOGRAPHY CHEST: CPT

## 2022-11-14 PROCEDURE — 82803 BLOOD GASES ANY COMBINATION: CPT

## 2022-11-14 PROCEDURE — 0202U NFCT DS 22 TRGT SARS-COV-2: CPT | Performed by: NURSE PRACTITIONER

## 2022-11-14 PROCEDURE — 87651 STREP A DNA AMP PROBE: CPT | Performed by: NURSE PRACTITIONER

## 2022-11-14 PROCEDURE — 83605 ASSAY OF LACTIC ACID: CPT

## 2022-11-14 PROCEDURE — 85379 FIBRIN DEGRADATION QUANT: CPT | Performed by: NURSE PRACTITIONER

## 2022-11-14 PROCEDURE — 99284 EMERGENCY DEPT VISIT MOD MDM: CPT

## 2022-11-14 PROCEDURE — 70491 CT SOFT TISSUE NECK W/DYE: CPT

## 2022-11-14 PROCEDURE — 36415 COLL VENOUS BLD VENIPUNCTURE: CPT

## 2022-11-14 PROCEDURE — 83880 ASSAY OF NATRIURETIC PEPTIDE: CPT | Performed by: NURSE PRACTITIONER

## 2022-11-14 PROCEDURE — 93005 ELECTROCARDIOGRAM TRACING: CPT | Performed by: EMERGENCY MEDICINE

## 2022-11-14 PROCEDURE — 25010000002 DEXAMETHASONE PER 1 MG: Performed by: NURSE PRACTITIONER

## 2022-11-14 PROCEDURE — 87040 BLOOD CULTURE FOR BACTERIA: CPT | Performed by: EMERGENCY MEDICINE

## 2022-11-14 PROCEDURE — 80053 COMPREHEN METABOLIC PANEL: CPT | Performed by: EMERGENCY MEDICINE

## 2022-11-14 PROCEDURE — 85025 COMPLETE CBC W/AUTO DIFF WBC: CPT | Performed by: EMERGENCY MEDICINE

## 2022-11-14 PROCEDURE — 96374 THER/PROPH/DIAG INJ IV PUSH: CPT

## 2022-11-14 PROCEDURE — 36600 WITHDRAWAL OF ARTERIAL BLOOD: CPT

## 2022-11-14 PROCEDURE — 84484 ASSAY OF TROPONIN QUANT: CPT | Performed by: NURSE PRACTITIONER

## 2022-11-14 RX ORDER — ALBUTEROL SULFATE 90 UG/1
2 AEROSOL, METERED RESPIRATORY (INHALATION) EVERY 4 HOURS PRN
Qty: 1 G | Refills: 0 | Status: SHIPPED | OUTPATIENT
Start: 2022-11-14

## 2022-11-14 RX ORDER — DEXAMETHASONE SODIUM PHOSPHATE 4 MG/ML
6 INJECTION, SOLUTION INTRA-ARTICULAR; INTRALESIONAL; INTRAMUSCULAR; INTRAVENOUS; SOFT TISSUE ONCE
Status: COMPLETED | OUTPATIENT
Start: 2022-11-14 | End: 2022-11-14

## 2022-11-14 RX ORDER — METHYLPREDNISOLONE 4 MG/1
TABLET ORAL
Qty: 21 TABLET | Refills: 0 | Status: SHIPPED | OUTPATIENT
Start: 2022-11-14

## 2022-11-14 RX ORDER — SODIUM CHLORIDE 0.9 % (FLUSH) 0.9 %
10 SYRINGE (ML) INJECTION AS NEEDED
Status: DISCONTINUED | OUTPATIENT
Start: 2022-11-14 | End: 2022-11-14 | Stop reason: HOSPADM

## 2022-11-14 RX ADMIN — IOPAMIDOL 50 ML: 755 INJECTION, SOLUTION INTRAVENOUS at 09:43

## 2022-11-14 RX ADMIN — IOPAMIDOL 100 ML: 755 INJECTION, SOLUTION INTRAVENOUS at 09:43

## 2022-11-14 RX ADMIN — DEXAMETHASONE SODIUM PHOSPHATE 6 MG: 4 INJECTION, SOLUTION INTRAMUSCULAR; INTRAVENOUS at 08:40

## 2022-11-14 RX ADMIN — SODIUM CHLORIDE, POTASSIUM CHLORIDE, SODIUM LACTATE AND CALCIUM CHLORIDE 1000 ML: 600; 310; 30; 20 INJECTION, SOLUTION INTRAVENOUS at 08:43

## 2022-11-14 NOTE — ED PROVIDER NOTES
Subjective   History of Present Illness  62-year-old female presents with a complaint of nonproductive cough, bilateral ear pain, subjective fever, sore throat right greater than left, dyspnea since Friday.  She denies any associated chest pain.  Denies recent travel, HRT, DVT PE history.  Denies ill contact.  Denies nausea vomiting diarrhea.  She reports she had natural COVID 1 year ago denies receiving any shots or boosters.  Denies receiving seasonal flu shot this year.  Denies ever receiving pneumonia shot.  She denies being a smoker.  Denies any medical history.    1. Location: Bilateral ear  2. Quality: Sore  3. Severity: Moderate  4. Worsening factors: Cough  5. Alleviating factors: Denies  6. Onset: 3 days  7. Radiation: Denies  8. Frequency: Constant with periods of intense  9. Co-morbidities: Past Medical History:  No date: Cancer (CMS/[facility])      Comment:  Ovarian Tumors removed      History provided by:  Patient   used: No        Review of Systems   Constitutional: Positive for chills and diaphoresis. Negative for fever.   HENT: Positive for ear pain and sore throat. Negative for congestion, rhinorrhea, sneezing, trouble swallowing and voice change.    Respiratory: Positive for shortness of breath. Negative for cough, chest tightness, wheezing and stridor.    Cardiovascular: Negative for chest pain, palpitations and leg swelling.   Gastrointestinal: Negative for nausea and vomiting.   Musculoskeletal: Negative for neck pain.   Skin: Negative for color change, pallor and rash.   Allergic/Immunologic: Negative for immunocompromised state.   Neurological: Negative for dizziness and weakness.   Psychiatric/Behavioral: Negative for confusion.   All other systems reviewed and are negative.      Past Medical History:   Diagnosis Date   • Cancer (CMS/[facility])     Ovarian Tumors removed       No Known Allergies    No past surgical history on file.    No family history on file.    Social History      Socioeconomic History   • Marital status:    Tobacco Use   • Smoking status: Never   Substance and Sexual Activity   • Alcohol use: No   • Drug use: No   • Sexual activity: Defer           Objective   Physical Exam  Vitals and nursing note reviewed.   Constitutional:       General: She is awake. She is not in acute distress.     Appearance: Normal appearance. She is well-developed. She is obese. She is ill-appearing and diaphoretic.   HENT:      Head: Normocephalic and atraumatic. No right periorbital erythema or left periorbital erythema.      Right Ear: Tympanic membrane, ear canal and external ear normal. No middle ear effusion. Tympanic membrane is not injected, erythematous, retracted or bulging.      Left Ear: Tympanic membrane, ear canal and external ear normal.  No middle ear effusion. Tympanic membrane is not injected, erythematous, retracted or bulging.      Nose: Nose normal. No rhinorrhea.      Mouth/Throat:      Lips: Pink. No lesions.      Mouth: Mucous membranes are moist.      Pharynx: Oropharynx is clear. Posterior oropharyngeal erythema present. No pharyngeal swelling, oropharyngeal exudate or uvula swelling.      Tonsils: 2+ on the right. 3+ on the left.      Comments: Uvula deviated to the right.   Eyes:      General: Lids are normal. Vision grossly intact. Gaze aligned appropriately.      Extraocular Movements: Extraocular movements intact.      Conjunctiva/sclera: Conjunctivae normal.      Pupils: Pupils are equal, round, and reactive to light.   Neck:      Trachea: Trachea and phonation normal.   Cardiovascular:      Rate and Rhythm: Regular rhythm. Tachycardia present.      Pulses: Normal pulses.           Radial pulses are 2+ on the right side and 2+ on the left side.        Dorsalis pedis pulses are 2+ on the right side and 2+ on the left side.        Posterior tibial pulses are 2+ on the right side and 2+ on the left side.      Heart sounds: Normal heart sounds, S1 normal and S2  normal. Heart sounds not distant. No murmur heard.    No friction rub. No gallop.   Pulmonary:      Effort: Pulmonary effort is normal. No respiratory distress.      Breath sounds: Normal breath sounds and air entry. No wheezing or rales.   Chest:      Chest wall: No tenderness.   Abdominal:      General: Bowel sounds are normal. There is no distension.      Palpations: Abdomen is soft. There is no mass.      Tenderness: There is no abdominal tenderness. There is no guarding or rebound.      Hernia: No hernia is present.   Musculoskeletal:      Cervical back: Full passive range of motion without pain, normal range of motion and neck supple.      Right lower leg: No edema.      Left lower leg: No edema.   Skin:     General: Skin is warm.      Capillary Refill: Capillary refill takes less than 2 seconds.      Coloration: Skin is not pale.      Findings: No erythema or rash.   Neurological:      Mental Status: She is alert and oriented to person, place, and time.      GCS: GCS eye subscore is 4. GCS verbal subscore is 5. GCS motor subscore is 6.   Psychiatric:         Mood and Affect: Mood normal.         Behavior: Behavior normal. Behavior is cooperative.         Thought Content: Thought content normal.         Judgment: Judgment normal.         Procedures     Sinus rhythm no ectopy rate of 86.  No previous available for comparison.  Interpreted by Dr. Montgomery and reviewed by me.      ED Course  ED Course as of 11/14/22 1149   Mon Nov 14, 2022   0802 Awaiting patient to go to CT and lab results. [AL]   1039 Patient was reassessed at this time. Oxygen was removed, to reassess in 15 minutes. [AL]      ED Course User Index  [AL] Kelly Latham APRN      CT Soft Tissue Neck With Contrast    Result Date: 11/14/2022   1. No evidence of tonsillar, peritonsillar, or retropharyngeal abscess 2. No evidence of pulmonary embolism 3. Bronchiolitis pattern in the superior segment of the right lower lobe 4. Enlarged multinodular  left thyroid lobe, status post right hemithyroidectomy. 5. Mild left sphenoid sinusitis  Electronically Signed By-Jw Batista On:11/14/2022 10:04 AM This report was finalized on [id number] by  Chintan Tatum    XR Chest 1 View    Result Date: 11/14/2022  No radiographic findings of pneumonia  Electronically Signed By-Jw Batista On:11/14/2022 7:19 AM This report was finalized on [id number] by  Jw Batista, .    CT Angiogram Chest Pulmonary Embolism    Result Date: 11/14/2022   1. No evidence of tonsillar, peritonsillar, or retropharyngeal abscess 2. No evidence of pulmonary embolism 3. Bronchiolitis pattern in the superior segment of the right lower lobe 4. Enlarged multinodular left thyroid lobe, status post right hemithyroidectomy. 5. Mild left sphenoid sinusitis  Electronically Signed By-Jw Batista On:11/14/2022 10:04 AM This report was finalized on [id number] by  Jw Batista, Chintan    Medications   sodium chloride 0.9 % flush 10 mL (has no administration in time range)   lactated ringers bolus 1,000 mL (1,000 mL Intravenous New Bag 11/14/22 0843)   dexamethasone (DECADRON) injection 6 mg (6 mg Intravenous Given 11/14/22 0840)   iopamidol (ISOVUE-370) 76 % injection 50 mL (50 mL Intravenous Given 11/14/22 0943)   iopamidol (ISOVUE-370) 76 % injection 100 mL (100 mL Intravenous Given 11/14/22 0943)     Labs Reviewed   RESPIRATORY PANEL PCR W/ COVID-19 (SARS-COV-2) [facility]E/[facility] IN-HOUSE, NP SWAB IN [facility], 3-4 HR TAT - Abnormal; Notable for the following components:       Result Value    Human Metapneumovirus Detected (*)     All other components within normal limits    Narrative:     In the setting of a positive respiratory panel with a viral infection PLUS a negative procalcitonin without other underlying concern for bacterial infection, consider observing off antibiotics or discontinuation of antibiotics and continue supportive care. If the respiratory panel is positive  for atypical bacterial infection (Bordetella pertussis, Chlamydophila pneumoniae, or Mycoplasma pneumoniae), consider antibiotic de-escalation to target atypical bacterial infection.   COMPREHENSIVE METABOLIC PANEL - Abnormal; Notable for the following components:    Glucose 113 (*)     CO2 21.0 (*)     All other components within normal limits    Narrative:     GFR Normal >60  Chronic Kidney Disease <60  Kidney Failure <15     D-DIMER, QUANTITATIVE - Abnormal; Notable for the following components:    D-Dimer, Quantitative 0.80 (*)     All other components within normal limits    Narrative:     Reference Range  --------------------------------------------------------------------     < 0.50   Negative Predictive Value  0.50-0.59   Indeterminate    >= 0.60   Probable VTE             A very low percentage of patients with DVT may yield D-Dimer results   below the cut-off of 0.50 mg/L FEU.  This is known to be more   prevalent in patients with distal DVT.             Results of this test should always be interpreted in conjunction with   the patient's medical history, clinical presentation and other   findings.  Clinical diagnosis should not be based on the result of   [place] D-Dimer alone.   SEDIMENTATION RATE - Abnormal; Notable for the following components:    Sed Rate 40 (*)     All other components within normal limits   C-REACTIVE PROTEIN - Abnormal; Notable for the following components:    C-Reactive Protein 4.74 (*)     All other components within normal limits   BLOOD GAS, ARTERIAL - Abnormal; Notable for the following components:    pO2, Arterial 144.1 (*)     O2 Saturation, Arterial 99.3 (*)     All other components within normal limits   RAPID STREP A SCREEN - Normal   CBC WITH AUTO DIFFERENTIAL - Normal   BNP (IN-HOUSE) - Normal    Narrative:     Among patients with dyspnea, NT-proBNP is highly sensitive for the detection of acute congestive heart failure. In addition NT-proBNP of <300 pg/ml effectively  rules out acute congestive heart failure with 99% negative predictive value.    Results may be falsely decreased if patient taking Biotin.     TROPONIN (IN-HOUSE) - Normal    Narrative:     Troponin T Reference Range:  <= 0.03 ng/mL-   Negative for AMI  >0.03 ng/mL-     Abnormal for myocardial necrosis.  Clinicians would have to utilize clinical acumen, EKG, Troponin and serial changes to determine if it is an Acute Myocardial Infarction or myocardial injury due to an underlying chronic condition.       Results may be falsely decreased if patient taking Biotin.     POC LACTATE - Normal   BLOOD CULTURE   BLOOD CULTURE   RAINBOW DRAW    Narrative:     The following orders were created for panel order [place] Draw.  Procedure                               Abnormality         Status                     ---------                               -----------         ------                     Green Top (Gel)[[id number]]                                  Final result               Lavender Top[[id number]]                                     Final result               Gold Top - SST[[id number]]                                                              Light Blue Top[[id number]]                                   Final result                 Please view results for these tests on the individual orders.   BLOOD GAS, ARTERIAL   POC LACTATE   CBC AND DIFFERENTIAL    Narrative:     The following orders were created for panel order CBC & Differential.  Procedure                               Abnormality         Status                     ---------                               -----------         ------                     CBC Auto Differential[[id number]]        Normal              Final result                 Please view results for these tests on the individual orders.   GREEN TOP   LAVENDER TOP   LIGHT BLUE TOP                                          MDM  Number of Diagnoses or Management Options  Acute bronchiolitis due to human  metapneumovirus  Acute cough  Acute sinusitis, recurrence not specified, unspecified location  Elevated blood pressure reading  Otalgia of both ears  Subjective fever  Diagnosis management comments: Chart Review: 6/7/2022 patient was seen at [facility] for arthritis left knee  Comorbidity: Past Medical History:  No date: Cancer (CMS/HCC)      Comment:  Ovarian Tumors removed  Imaging: Was interpreted by physician and reviewed by myself: CT Angiogram Chest Pulmonary Embolism   Final Result         1. No evidence of tonsillar, peritonsillar, or retropharyngeal abscess    2. No evidence of pulmonary embolism    3. Bronchiolitis pattern in the superior segment of the right lower lobe    4. Enlarged multinodular left thyroid lobe, status post right    hemithyroidectomy.    5. Mild left sphenoid sinusitis         Electronically Signed By-Jw Batista On:11/14/2022 10:04 AM    This report was finalized on [id number] by  Jw Batista, .     CT Soft Tissue Neck With Contrast   Final Result         1. No evidence of tonsillar, peritonsillar, or retropharyngeal abscess    2. No evidence of pulmonary embolism    3. Bronchiolitis pattern in the superior segment of the right lower lobe    4. Enlarged multinodular left thyroid lobe, status post right    hemithyroidectomy.    5. Mild left sphenoid sinusitis         Electronically Signed BySixto Batista On:11/14/2022 10:04 AM    This report was finalized on [id number] by  Jw Batista, .     XR Chest 1 View   Final Result    No radiographic findings of pneumonia         Electronically Signed BySixto Batista On:11/14/2022 7:19 AM    This report was finalized on [id number] by  Jw Batista, .    Patient undressed and placed in gown for exam.  Appropriate PPE worn during patient exam.  Patient is alert and oriented x3.  She is noted to be profusely diaphoretic.  She is also hypertensive and tachycardic.  She is noted to be hypoxic at 85% on room air in  triage, this is improved to 96% on 2 L nasal cannula.  Patient's left tonsil is noted to be larger than the right with some right uvula deviation.  CT of the neck with IV contrast obtained with the above findings IV established and labs obtained.  Dyspnea protocol initiated.  Patient was given lactated Ringer's 1 L bolus and Decadron 6 mg IV. CBC unremarkable.  CMP essentially unremarkable.  Sed rate was elevated at 40 D-dimer was elevated at 0.8, CT PE protocol obtained.  ABG was significant for PO2 of 144.1, otherwise unremarkable.  Patient's oxygen in triage was noted to be 85%, this was not consistent with her ABG.  Her oxygen was discontinued and she has maintain sats at 98% on room air.  CT was significant for bronchiolitis, otherwise normal.  Patient has known thyroid disease and was informed of enlarged left thyroid and need for follow-up.  She verbalized understanding of this.  She is given a prescription for Medrol Dosepak and an albuterol inhaler for bronchitis.  She is encouraged to perform sinus rinses.    Disposition/Treatment: Discussed results with patient, verbalized understanding.  Discussed reasons to return to the ER, patient verbalized understanding.  Agreeable with plan of care.  Patient was stable upon discharge.       Part of this note may be an electronic transcription/translation of spoken language to printed text using the Dragon Dictation System.            Amount and/or Complexity of Data Reviewed  Clinical lab tests: reviewed  Tests in the radiology section of CPT®: reviewed  Tests in the medicine section of CPT®: reviewed  Decide to obtain previous medical records or to obtain history from someone other than the patient: yes    Patient Progress  Patient progress: stable      Final diagnoses:   Acute cough   Otalgia of both ears   Subjective fever   Acute sinusitis, recurrence not specified, unspecified location   Acute bronchiolitis due to human metapneumovirus   Elevated blood  pressure reading       ED Disposition  ED Disposition     ED Disposition   Discharge    Condition   Stable    Comment   --             Paola Dodson MD  [address]  [place] [address]  [facility] [address]  [phone]    Schedule an appointment as soon as possible for a visit       [facility] EMERGENCY DEPARTMENT  [address] [facility] [address] [phone]  Go to   If symptoms worsen         Medication List      New Prescriptions    albuterol sulfate  (90 Base) MCG/ACT inhaler  Commonly known as: PROVENTIL HFA;VENTOLIN HFA;PROAIR HFA  Inhale 2 puffs Every 4 (Four) Hours As Needed for Wheezing or Shortness of Air.     methylPREDNISolone 4 MG dose pack  Commonly known as: MEDROL  Take as directed on package instructions.           Where to Get Your Medications      These medications were sent to [facility] #[id number] - [place], [place] - [address] [facility] AT [facility] [address] [facility] [address] - [phone]  - [phone] FX  [address] [facility] [place] [address]    Phone: [phone]   · albuterol sulfate  (90 Base) MCG/ACT inhaler  · methylPREDNISolone 4 MG dose pack          Kelly Latham, APRN  11/14/22 4089

## 2022-11-14 NOTE — DISCHARGE INSTRUCTIONS
He did have elevated blood pressure while in the ER, monitor this twice weekly and record this to review with your family doctor.  Take Medrol Dosepak and use inhaler as prescribed.  Cover your cough with your elbow and practice good handwashing.  Perform sinus rinses with sterile water as directed.  Schedule follow-up with your primary care physician for recheck.  Return to the ER for new or worsening symptoms.

## 2022-11-16 LAB — QT INTERVAL: 362 MS

## 2022-11-19 LAB
BACTERIA SPEC AEROBE CULT: NORMAL
BACTERIA SPEC AEROBE CULT: NORMAL

## 2024-01-17 ENCOUNTER — APPOINTMENT (OUTPATIENT)
Dept: CT IMAGING | Facility: HOSPITAL | Age: 64
End: 2024-01-17
Payer: MEDICARE

## 2024-01-17 ENCOUNTER — HOSPITAL ENCOUNTER (EMERGENCY)
Facility: HOSPITAL | Age: 64
Discharge: HOME | End: 2024-01-17
Attending: EMERGENCY MEDICINE | Admitting: EMERGENCY MEDICINE
Payer: MEDICARE

## 2024-01-17 VITALS
HEIGHT: 63 IN | WEIGHT: 254.19 LBS | DIASTOLIC BLOOD PRESSURE: 82 MMHG | HEART RATE: 76 BPM | BODY MASS INDEX: 45.04 KG/M2 | RESPIRATION RATE: 17 BRPM | TEMPERATURE: 98.3 F | OXYGEN SATURATION: 97 % | SYSTOLIC BLOOD PRESSURE: 127 MMHG

## 2024-01-17 DIAGNOSIS — N39.0 URINARY TRACT INFECTION WITHOUT HEMATURIA, SITE UNSPECIFIED: Primary | ICD-10-CM

## 2024-01-17 DIAGNOSIS — A59.9 TRICHOMONAS INFECTION: ICD-10-CM

## 2024-01-17 LAB
ALBUMIN SERPL-MCNC: 4 G/DL (ref 3.5–5.2)
ALBUMIN/GLOB SERPL: 1.3 G/DL
ALP SERPL-CCNC: 87 U/L (ref 39–117)
ALT SERPL W P-5'-P-CCNC: 18 U/L (ref 1–33)
ANION GAP SERPL CALCULATED.3IONS-SCNC: 9 MMOL/L (ref 5–15)
AST SERPL-CCNC: 16 U/L (ref 1–32)
BACTERIA UR QL AUTO: ABNORMAL /HPF
BASOPHILS # BLD AUTO: 0.1 10*3/MM3 (ref 0–0.2)
BASOPHILS NFR BLD AUTO: 1.2 % (ref 0–1.5)
BILIRUB SERPL-MCNC: 0.4 MG/DL (ref 0–1.2)
BILIRUB UR QL STRIP: NEGATIVE
BUN SERPL-MCNC: 13 MG/DL (ref 8–23)
BUN/CREAT SERPL: 14.1 (ref 7–25)
C TRACH RRNA CVX QL NAA+PROBE: NOT DETECTED
CALCIUM SPEC-SCNC: 9.3 MG/DL (ref 8.6–10.5)
CHLORIDE SERPL-SCNC: 105 MMOL/L (ref 98–107)
CLARITY UR: ABNORMAL
CO2 SERPL-SCNC: 27 MMOL/L (ref 22–29)
COLOR UR: ABNORMAL
CREAT SERPL-MCNC: 0.92 MG/DL (ref 0.57–1)
DEPRECATED RDW RBC AUTO: 46.4 FL (ref 37–54)
EGFRCR SERPLBLD CKD-EPI 2021: 69.2 ML/MIN/1.73
EOSINOPHIL # BLD AUTO: 0.2 10*3/MM3 (ref 0–0.4)
EOSINOPHIL NFR BLD AUTO: 2.4 % (ref 0.3–6.2)
ERYTHROCYTE [DISTWIDTH] IN BLOOD BY AUTOMATED COUNT: 13 % (ref 12.3–15.4)
GLOBULIN UR ELPH-MCNC: 3 GM/DL
GLUCOSE SERPL-MCNC: 100 MG/DL (ref 65–99)
GLUCOSE UR STRIP-MCNC: NEGATIVE MG/DL
HCT VFR BLD AUTO: 41.4 % (ref 34–46.6)
HGB BLD-MCNC: 13.6 G/DL (ref 12–15.9)
HGB UR QL STRIP.AUTO: NEGATIVE
HYALINE CASTS UR QL AUTO: ABNORMAL /LPF
KETONES UR QL STRIP: ABNORMAL
LEUKOCYTE ESTERASE UR QL STRIP.AUTO: ABNORMAL
LIPASE SERPL-CCNC: 21 U/L (ref 13–60)
LYMPHOCYTES # BLD AUTO: 2.2 10*3/MM3 (ref 0.7–3.1)
LYMPHOCYTES NFR BLD AUTO: 32.1 % (ref 19.6–45.3)
MCH RBC QN AUTO: 31.6 PG (ref 26.6–33)
MCHC RBC AUTO-ENTMCNC: 32.9 G/DL (ref 31.5–35.7)
MCV RBC AUTO: 96 FL (ref 79–97)
MONOCYTES # BLD AUTO: 0.4 10*3/MM3 (ref 0.1–0.9)
MONOCYTES NFR BLD AUTO: 5.6 % (ref 5–12)
N GONORRHOEA RRNA SPEC QL NAA+PROBE: NOT DETECTED
NEUTROPHILS NFR BLD AUTO: 4.1 10*3/MM3 (ref 1.7–7)
NEUTROPHILS NFR BLD AUTO: 58.7 % (ref 42.7–76)
NITRITE UR QL STRIP: NEGATIVE
NRBC BLD AUTO-RTO: 0.1 /100 WBC (ref 0–0.2)
PH UR STRIP.AUTO: 7.5 [PH] (ref 5–8)
PLATELET # BLD AUTO: 317 10*3/MM3 (ref 140–450)
PMV BLD AUTO: 7.7 FL (ref 6–12)
POTASSIUM SERPL-SCNC: 4.3 MMOL/L (ref 3.5–5.2)
PROT SERPL-MCNC: 7 G/DL (ref 6–8.5)
PROT UR QL STRIP: ABNORMAL
RBC # BLD AUTO: 4.31 10*6/MM3 (ref 3.77–5.28)
RBC # UR STRIP: ABNORMAL /HPF
REF LAB TEST METHOD: ABNORMAL
SODIUM SERPL-SCNC: 141 MMOL/L (ref 136–145)
SP GR UR STRIP: 1.02 (ref 1–1.03)
SQUAMOUS #/AREA URNS HPF: ABNORMAL /HPF
TRICHOMONAS #/AREA URNS HPF: ABNORMAL /HPF
UROBILINOGEN UR QL STRIP: ABNORMAL
WBC # UR STRIP: ABNORMAL /HPF
WBC NRBC COR # BLD AUTO: 7 10*3/MM3 (ref 3.4–10.8)
WHOLE BLOOD HOLD COAG: NORMAL

## 2024-01-17 PROCEDURE — 87491 CHLMYD TRACH DNA AMP PROBE: CPT | Performed by: PHYSICIAN ASSISTANT

## 2024-01-17 PROCEDURE — 85025 COMPLETE CBC W/AUTO DIFF WBC: CPT | Performed by: PHYSICIAN ASSISTANT

## 2024-01-17 PROCEDURE — 25010000002 KETOROLAC TROMETHAMINE PER 15 MG: Performed by: PHYSICIAN ASSISTANT

## 2024-01-17 PROCEDURE — 87591 N.GONORRHOEAE DNA AMP PROB: CPT | Performed by: PHYSICIAN ASSISTANT

## 2024-01-17 PROCEDURE — 25010000002 CEFTRIAXONE PER 250 MG: Performed by: PHYSICIAN ASSISTANT

## 2024-01-17 PROCEDURE — 81001 URINALYSIS AUTO W/SCOPE: CPT | Performed by: PHYSICIAN ASSISTANT

## 2024-01-17 PROCEDURE — 96365 THER/PROPH/DIAG IV INF INIT: CPT

## 2024-01-17 PROCEDURE — 80053 COMPREHEN METABOLIC PANEL: CPT | Performed by: PHYSICIAN ASSISTANT

## 2024-01-17 PROCEDURE — 74176 CT ABD & PELVIS W/O CONTRAST: CPT

## 2024-01-17 PROCEDURE — 83690 ASSAY OF LIPASE: CPT | Performed by: PHYSICIAN ASSISTANT

## 2024-01-17 PROCEDURE — 25010000002 ONDANSETRON PER 1 MG: Performed by: PHYSICIAN ASSISTANT

## 2024-01-17 PROCEDURE — 87086 URINE CULTURE/COLONY COUNT: CPT | Performed by: PHYSICIAN ASSISTANT

## 2024-01-17 PROCEDURE — 96375 TX/PRO/DX INJ NEW DRUG ADDON: CPT

## 2024-01-17 PROCEDURE — 99284 EMERGENCY DEPT VISIT MOD MDM: CPT

## 2024-01-17 RX ORDER — KETOROLAC TROMETHAMINE 30 MG/ML
15 INJECTION, SOLUTION INTRAMUSCULAR; INTRAVENOUS ONCE
Status: COMPLETED | OUTPATIENT
Start: 2024-01-17 | End: 2024-01-17

## 2024-01-17 RX ORDER — METRONIDAZOLE 500 MG/1
500 TABLET ORAL 2 TIMES DAILY
Qty: 14 TABLET | Refills: 0 | Status: SHIPPED | OUTPATIENT
Start: 2024-01-17 | End: 2024-01-24

## 2024-01-17 RX ORDER — ONDANSETRON 4 MG/1
4 TABLET, ORALLY DISINTEGRATING ORAL EVERY 8 HOURS PRN
Qty: 20 TABLET | Refills: 0 | Status: SHIPPED | OUTPATIENT
Start: 2024-01-17

## 2024-01-17 RX ORDER — ONDANSETRON 2 MG/ML
4 INJECTION INTRAMUSCULAR; INTRAVENOUS ONCE
Status: COMPLETED | OUTPATIENT
Start: 2024-01-17 | End: 2024-01-17

## 2024-01-17 RX ORDER — CEFDINIR 300 MG/1
300 CAPSULE ORAL 2 TIMES DAILY
Qty: 14 CAPSULE | Refills: 0 | Status: SHIPPED | OUTPATIENT
Start: 2024-01-17

## 2024-01-17 RX ORDER — SODIUM CHLORIDE 0.9 % (FLUSH) 0.9 %
10 SYRINGE (ML) INJECTION AS NEEDED
Status: DISCONTINUED | OUTPATIENT
Start: 2024-01-17 | End: 2024-01-17 | Stop reason: HOSPADM

## 2024-01-17 RX ORDER — PHENAZOPYRIDINE HYDROCHLORIDE 200 MG/1
200 TABLET, FILM COATED ORAL 3 TIMES DAILY PRN
Qty: 6 TABLET | Refills: 0 | Status: SHIPPED | OUTPATIENT
Start: 2024-01-17

## 2024-01-17 RX ADMIN — CEFTRIAXONE 2000 MG: 2 INJECTION, POWDER, FOR SOLUTION INTRAMUSCULAR; INTRAVENOUS at 11:34

## 2024-01-17 RX ADMIN — KETOROLAC TROMETHAMINE 15 MG: 30 INJECTION, SOLUTION INTRAMUSCULAR; INTRAVENOUS at 10:23

## 2024-01-17 RX ADMIN — ONDANSETRON 4 MG: 2 INJECTION INTRAMUSCULAR; INTRAVENOUS at 10:22

## 2024-01-17 NOTE — ED PROVIDER NOTES
Subjective   History of Present Illness  Is a 63-year-old  female with a past medical history of UTIs who presents to the ER today with abdominal and flank pain.  Patient states pain started Sunday after getting ready for [employer].  Reports pain upon urination and urinary frequency.  Describes the pain as sharp which radiates to her abdomen on the right upper quadrant and right flank.  Patient states she was diagnosed with a UTI back in December but never received any medication for it.  She has positive for dysuria, polyuria, nausea, vomiting, constipation, and headaches.  She is negative for hematuria, chest pain, shortness of breath, dizziness, fever, diarrhea, cough, congestion.  No reports of abnormal vaginal bleeding or discharge.    History provided by:  Patient      Review of Systems   Constitutional:  Negative for fever.   HENT:  Negative for congestion.    Respiratory:  Negative for shortness of breath.    Cardiovascular:  Negative for chest pain and leg swelling.   Gastrointestinal:  Positive for abdominal pain, constipation, nausea and vomiting. Negative for diarrhea.   Endocrine: Positive for polyuria.   Genitourinary:  Positive for dysuria, flank pain and frequency. Negative for hematuria.   Skin: Negative.        Past Medical History:   Diagnosis Date    Cancer     Ovarian Tumors removed       No Known Allergies    No past surgical history on file.    No family history on file.    Social History     Socioeconomic History    Marital status:    Tobacco Use    Smoking status: Never   Substance and Sexual Activity    Alcohol use: No    Drug use: No    Sexual activity: Defer           Objective   Physical Exam  Vitals and nursing note reviewed.   Constitutional:       General: She is not in acute distress.     Appearance: She is well-developed. She is obese. She is not ill-appearing, toxic-appearing or diaphoretic.   HENT:      Head: Normocephalic and atraumatic.      Mouth/Throat:      " Mouth: Mucous membranes are moist.      Pharynx: Oropharynx is clear.   Eyes:      General: No scleral icterus.     Extraocular Movements: Extraocular movements intact.      Pupils: Pupils are equal, round, and reactive to light.   Cardiovascular:      Rate and Rhythm: Normal rate and regular rhythm.      Pulses: Normal pulses.      Heart sounds: No murmur heard.     No friction rub. No gallop.   Pulmonary:      Effort: Pulmonary effort is normal. No respiratory distress.      Breath sounds: Normal breath sounds. No stridor. No wheezing, rhonchi or rales.   Chest:      Chest wall: No tenderness.   Abdominal:      General: Bowel sounds are normal. There is no distension. There are no signs of injury.      Palpations: Abdomen is soft.      Tenderness: There is abdominal tenderness in the right upper quadrant. There is right CVA tenderness. There is no left CVA tenderness, guarding or rebound.      Hernia: No hernia is present.   Skin:     General: Skin is warm.      Capillary Refill: Capillary refill takes less than 2 seconds.      Coloration: Skin is not cyanotic, jaundiced or pale.      Findings: No rash.   Neurological:      General: No focal deficit present.      Mental Status: She is alert and oriented to person, place, and time.   Psychiatric:         Mood and Affect: Mood normal.         Behavior: Behavior normal.         Procedures           ED Course  ED Course as of 01/17/24 1240   Wed Jan 17, 2024   1119 Trichomonas, UA(!): Moderate/2+ [AA]   1125 Urinalysis, Microscopic Only - Urine, Clean Catch(!) [AA]   1126 Results including trichomonas were discussed with the patient at bedside.  We did discuss having partner being tested/treated.  She denies any abnormal vaginal discharge, bleeding, or pain [AA]      ED Course User Index  [AA] Martin Blackmon PA      /82 (BP Location: Right arm, Patient Position: Lying)   Pulse 76   Temp 98.3 °F (36.8 °C) (Oral)   Resp 17   Ht 160 cm (63\")   Wt 115 kg " (254 lb 3.1 oz)   SpO2 97%   BMI 45.03 kg/m²   Medications   sodium chloride 0.9 % flush 10 mL (has no administration in time range)   ketorolac (TORADOL) injection 15 mg (15 mg Intravenous Given 1/17/24 1023)   ondansetron (ZOFRAN) injection 4 mg (4 mg Intravenous Given 1/17/24 1022)   cefTRIAXone (ROCEPHIN) 2,000 mg in sodium chloride 0.9 % 100 mL IVPB (0 mg Intravenous Stopped 1/17/24 1155)     Labs Reviewed   COMPREHENSIVE METABOLIC PANEL - Abnormal; Notable for the following components:       Result Value    Glucose 100 (*)     All other components within normal limits    Narrative:     GFR Normal >60  Chronic Kidney Disease <60  Kidney Failure <15     URINALYSIS W/ CULTURE IF INDICATED - Abnormal; Notable for the following components:    Color, UA Dark Yellow (*)     Appearance, UA Cloudy (*)     Ketones, UA Trace (*)     Protein, UA 30 mg/dL (1+) (*)     Leuk Esterase, UA Large (3+) (*)     All other components within normal limits    Narrative:     In absence of clinical symptoms, the presence of pyuria, bacteria, and/or nitrites on the urinalysis result does not correlate with infection.   URINALYSIS, MICROSCOPIC ONLY - Abnormal; Notable for the following components:    WBC, UA 6-10 (*)     Bacteria, UA Trace (*)     Squamous Epithelial Cells, UA 7-12 (*)     Trichomonas, UA Moderate/2+ (*)     All other components within normal limits   LIPASE - Normal   CBC WITH AUTO DIFFERENTIAL - Normal   URINE CULTURE   CHLAMYDIA TRACHOMATIS, NEISSERIA GONORRHOEAE, PCR   CBC AND DIFFERENTIAL    Narrative:     The following orders were created for panel order CBC & Differential.  Procedure                               Abnormality         Status                     ---------                               -----------         ------                     CBC Auto Differential[[id number]]        Normal              Final result                 Please view results for these tests on the individual orders.   EXTRA TUBES     Narrative:     The following orders were created for panel order Extra Tubes.  Procedure                               Abnormality         Status                     ---------                               -----------         ------                     Light Blue Top[[id number]]                                   Final result                 Please view results for these tests on the individual orders.   LIGHT BLUE TOP     CT Abdomen Pelvis Stone Protocol   Final Result   Impression:   1.No acute abnormality identified within the abdomen or pelvis.   2.No hydronephrosis or urinary tract calculi identified.   3.Colonic diverticulosis.   4.Moderate colonic stool.   5.Additional findings as detailed above.      Electronically Signed: Dez Silva MD     1/17/2024 11:03 AM EST     Workstation ID: [id number]                                               Medical Decision Making    Differentials: UTI, pyelonephritis, obstructing kidney stone, intra-abdominal infection      ;this list is not all inclusive and does not constitute the entirety of considered causes  Labs: As above  Radiology: My interpretation CT abdomen and pelvis shows no obvious obstructing stone correlated with radiologist interpretation  CT Abdomen Pelvis Stone Protocol   Final Result    Impression:    1.No acute abnormality identified within the abdomen or pelvis.    2.No hydronephrosis or urinary tract calculi identified.    3.Colonic diverticulosis.    4.Moderate colonic stool.    5.Additional findings as detailed above.        Electronically Signed: Dez Silva MD      1/17/2024 11:03 AM EST      Workstation ID: [id number]     Disposition/Treatment:  Appropriate PPE was worn during exam and throughout all encounters with the patient.  While in the ED patient was afebrile and appeared nontoxic presented with urinary complaints and flank pain.  Does report recent history of UTI back in December was prescribed antibiotics but states she never got them  from the pharmacy.    Labs and imaging were obtained.CBC shows no leukocytosis or anemia.  Metabolic panel showed glucose 100 otherwise normal.  Lipase normal.  Urinalysis significant for trace bacteria large leukocyte esterase negative nitrites.  Did also show moderate trichomonas.  These were discussed with the patient she has any abnormal vaginal bleeding or discharge.  She will be treated with Flagyl for trichomonas and was given a dose of Rocephin while in the ED for UTI.  Gonorrhea and Chlamydia pending upon discharge.  We did discuss talking to any sexual partners in regards to STD for further evaluation and treatment CT abdomen and pelvis shows no obvious infection or obstruction.  There are no signs of acute abdomen or peritonitis on today's exam.  Findings were discussed with the patient at bedside who voiced understanding of discharge along with signs and symptoms to return.  She will be given Flagyl and cefdinir for home.  She is also given Zofran and Pyridium.  All questions were answered.  Gonorrhea and Chlamydia pending upon discharge    This document is intended for medical expert use only. Reading of this document by patients and/or patient's family without participating medical staff guidance may result in misinterpretation and unintended morbidity.  Any interpretation of such data is the responsibility of the patient and/or family member responsible for the patient in concert with their primary or specialist providers, not to be left for sources of online searches such as [employer], [employer] or similar queries. Relying on these approaches to knowledge may result in misinterpretation, misguided goals of care and even death should patients or family members try recommendations outside of the realm of professional medical care in a supervised inpatient environment.       Problems Addressed:  Trichomonas infection: complicated acute illness or injury  Urinary tract infection without hematuria, site  unspecified: complicated acute illness or injury    Amount and/or Complexity of Data Reviewed  Labs: ordered. Decision-making details documented in ED Course.  Radiology: ordered.    Risk  Prescription drug management.        Final diagnoses:   Urinary tract infection without hematuria, site unspecified   Trichomonas infection       ED Disposition  ED Disposition       ED Disposition   Discharge    Condition   Stable    Comment   --               Paola Dodson MD  [address]  [place] [address]  [facility] [address]  [phone]    Schedule an appointment as soon as possible for a visit in 3 days      [facility] EMERGENCY DEPARTMENT  [address] [address] [phone]  Go to   If symptoms worsen    [facility]  [address]  [facility] [address] [phone]  Call   As needed for further STD testing as desired         Medication List        New Prescriptions      cefdinir 300 MG capsule  Commonly known as: OMNICEF  Take 1 capsule by mouth 2 (Two) Times a Day.     metroNIDAZOLE 500 MG tablet  Commonly known as: FLAGYL  Take 1 tablet by mouth 2 (Two) Times a Day for 7 days.     ondansetron ODT 4 MG disintegrating tablet  Commonly known as: ZOFRAN-ODT  Place 1 tablet on the tongue Every 8 (Eight) Hours As Needed for Nausea.     phenazopyridine 200 MG tablet  Commonly known as: PYRIDIUM  Take 1 tablet by mouth 3 (Three) Times a Day As Needed for Bladder Spasms.               Where to Get Your Medications        These medications were sent to [id number] - [place], [place] - [address] AT [address] - [phone]  - [phone]   [address] [place] [address]      Phone: [phone]   cefdinir 300 MG capsule  metroNIDAZOLE 500 MG tablet  ondansetron ODT 4 MG disintegrating tablet  phenazopyridine 200 MG tablet            Martin Blackmon PA  01/17/24 4960

## 2024-01-17 NOTE — Clinical Note
[facility] EMERGENCY DEPARTMENT  [address] [facility] [place] [address]  Phone: [phone]    Latasha Ruiz was seen and treated in our emergency department on 1/17/2024.  She may return to work on 01/19/2024.         Thank you for choosing [facility].    Martin Blackmon PA

## 2024-01-17 NOTE — DISCHARGE INSTRUCTIONS
Take antibiotics as directed.  Be sure to take full course.  Consider taking probiotic or eating yogurt daily while on antibiotic and up to 10 days after to replace the good bacteria in your gastrointestinal tract; this can reduce chances of developing a GI infection such as C difficile    Follow-up with your primary care provider in 3-5 days.  If you do not have a primary care provider call [phone] for help in finding one, or you may follow up with [facility] at [phone].    Return to ED for any new or worsening symptoms    Let all sexual partners know about STD so that they can be tested/treated.  Stain from sexual activity until completion of antibiotic.

## 2024-01-18 LAB — BACTERIA SPEC AEROBE CULT: NO GROWTH

## 2024-03-18 ENCOUNTER — LAB (OUTPATIENT)
Dept: FAMILY MEDICINE CLINIC | Facility: CLINIC | Age: 64
End: 2024-03-18
Payer: MEDICARE

## 2024-03-18 ENCOUNTER — OFFICE VISIT (OUTPATIENT)
Dept: FAMILY MEDICINE CLINIC | Facility: CLINIC | Age: 64
End: 2024-03-18
Payer: MEDICARE

## 2024-03-18 VITALS
BODY MASS INDEX: 45.89 KG/M2 | WEIGHT: 259 LBS | TEMPERATURE: 96.8 F | OXYGEN SATURATION: 96 % | HEART RATE: 80 BPM | HEIGHT: 63 IN | SYSTOLIC BLOOD PRESSURE: 140 MMHG | DIASTOLIC BLOOD PRESSURE: 96 MMHG

## 2024-03-18 DIAGNOSIS — Z13.220 SCREENING FOR LIPID DISORDERS: ICD-10-CM

## 2024-03-18 DIAGNOSIS — Z23 NEED FOR DIPHTHERIA-TETANUS-PERTUSSIS (TDAP) VACCINE: ICD-10-CM

## 2024-03-18 DIAGNOSIS — Z12.31 SCREENING MAMMOGRAM FOR BREAST CANCER: ICD-10-CM

## 2024-03-18 DIAGNOSIS — Z13.21 ENCOUNTER FOR VITAMIN DEFICIENCY SCREENING: ICD-10-CM

## 2024-03-18 DIAGNOSIS — Z76.89 ENCOUNTER TO ESTABLISH CARE: Primary | ICD-10-CM

## 2024-03-18 DIAGNOSIS — Z13.1 SCREENING FOR DIABETES MELLITUS: ICD-10-CM

## 2024-03-18 DIAGNOSIS — Z11.59 NEED FOR HEPATITIS C SCREENING TEST: ICD-10-CM

## 2024-03-18 DIAGNOSIS — M54.40 LOW BACK PAIN WITH SCIATICA, SCIATICA LATERALITY UNSPECIFIED, UNSPECIFIED BACK PAIN LATERALITY, UNSPECIFIED CHRONICITY: ICD-10-CM

## 2024-03-18 DIAGNOSIS — M54.2 NECK PAIN: ICD-10-CM

## 2024-03-18 DIAGNOSIS — Z12.11 COLON CANCER SCREENING: ICD-10-CM

## 2024-03-18 DIAGNOSIS — Z13.820 SCREENING FOR OSTEOPOROSIS: ICD-10-CM

## 2024-03-18 DIAGNOSIS — E03.9 HYPOTHYROIDISM, UNSPECIFIED TYPE: ICD-10-CM

## 2024-03-18 DIAGNOSIS — E66.01 CLASS 3 SEVERE OBESITY WITH SERIOUS COMORBIDITY AND BODY MASS INDEX (BMI) OF 45.0 TO 49.9 IN ADULT, UNSPECIFIED OBESITY TYPE: ICD-10-CM

## 2024-03-18 DIAGNOSIS — Z12.4 SCREENING FOR CERVICAL CANCER: ICD-10-CM

## 2024-03-18 PROBLEM — D35.00 PHEOCHROMOCYTOMA: Status: ACTIVE | Noted: 2024-03-18

## 2024-03-18 PROBLEM — E03.8 OTHER SPECIFIED HYPOTHYROIDISM: Status: ACTIVE | Noted: 2024-03-18

## 2024-03-18 LAB
25(OH)D3 SERPL-MCNC: 23.4 NG/ML (ref 30–100)
ALBUMIN SERPL-MCNC: 4 G/DL (ref 3.5–5.2)
ALBUMIN/GLOB SERPL: 1.4 G/DL
ALP SERPL-CCNC: 82 U/L (ref 39–117)
ALT SERPL W P-5'-P-CCNC: 17 U/L (ref 1–33)
ANION GAP SERPL CALCULATED.3IONS-SCNC: 6.9 MMOL/L (ref 5–15)
AST SERPL-CCNC: 17 U/L (ref 1–32)
BASOPHILS # BLD AUTO: 0.04 10*3/MM3 (ref 0–0.2)
BASOPHILS NFR BLD AUTO: 0.6 % (ref 0–1.5)
BILIRUB BLD-MCNC: NEGATIVE MG/DL
BILIRUB SERPL-MCNC: 0.3 MG/DL (ref 0–1.2)
BUN SERPL-MCNC: 16 MG/DL (ref 8–23)
BUN/CREAT SERPL: 16.5 (ref 7–25)
CALCIUM SPEC-SCNC: 9.1 MG/DL (ref 8.6–10.5)
CHLORIDE SERPL-SCNC: 104 MMOL/L (ref 98–107)
CHOLEST SERPL-MCNC: 221 MG/DL (ref 0–200)
CLARITY, POC: CLEAR
CO2 SERPL-SCNC: 27.1 MMOL/L (ref 22–29)
COLOR UR: YELLOW
CREAT SERPL-MCNC: 0.97 MG/DL (ref 0.57–1)
DEPRECATED RDW RBC AUTO: 40.7 FL (ref 37–54)
EGFRCR SERPLBLD CKD-EPI 2021: 65 ML/MIN/1.73
EOSINOPHIL # BLD AUTO: 0.23 10*3/MM3 (ref 0–0.4)
EOSINOPHIL NFR BLD AUTO: 3.4 % (ref 0.3–6.2)
ERYTHROCYTE [DISTWIDTH] IN BLOOD BY AUTOMATED COUNT: 11.7 % (ref 12.3–15.4)
EXPIRATION DATE: NORMAL
GLOBULIN UR ELPH-MCNC: 2.8 GM/DL
GLUCOSE SERPL-MCNC: 97 MG/DL (ref 65–99)
GLUCOSE UR STRIP-MCNC: NEGATIVE MG/DL
HBA1C MFR BLD: 5.5 % (ref 4.8–5.6)
HCT VFR BLD AUTO: 40.6 % (ref 34–46.6)
HCV AB SER DONR QL: NORMAL
HDLC SERPL-MCNC: 44 MG/DL (ref 40–60)
HGB BLD-MCNC: 13.2 G/DL (ref 12–15.9)
IMM GRANULOCYTES # BLD AUTO: 0.02 10*3/MM3 (ref 0–0.05)
IMM GRANULOCYTES NFR BLD AUTO: 0.3 % (ref 0–0.5)
KETONES UR QL: NEGATIVE
LDLC SERPL CALC-MCNC: 160 MG/DL (ref 0–100)
LDLC/HDLC SERPL: 3.59 {RATIO}
LEUKOCYTE EST, POC: NEGATIVE
LYMPHOCYTES # BLD AUTO: 2.44 10*3/MM3 (ref 0.7–3.1)
LYMPHOCYTES NFR BLD AUTO: 36.3 % (ref 19.6–45.3)
Lab: NORMAL
MCH RBC QN AUTO: 31.1 PG (ref 26.6–33)
MCHC RBC AUTO-ENTMCNC: 32.5 G/DL (ref 31.5–35.7)
MCV RBC AUTO: 95.8 FL (ref 79–97)
MONOCYTES # BLD AUTO: 0.48 10*3/MM3 (ref 0.1–0.9)
MONOCYTES NFR BLD AUTO: 7.1 % (ref 5–12)
NEUTROPHILS NFR BLD AUTO: 3.52 10*3/MM3 (ref 1.7–7)
NEUTROPHILS NFR BLD AUTO: 52.3 % (ref 42.7–76)
NITRITE UR-MCNC: NEGATIVE MG/ML
NRBC BLD AUTO-RTO: 0 /100 WBC (ref 0–0.2)
PH UR: 5 [PH] (ref 5–8)
PLATELET # BLD AUTO: 302 10*3/MM3 (ref 140–450)
PMV BLD AUTO: 10 FL (ref 6–12)
POTASSIUM SERPL-SCNC: 4.4 MMOL/L (ref 3.5–5.2)
PROT SERPL-MCNC: 6.8 G/DL (ref 6–8.5)
PROT UR STRIP-MCNC: NEGATIVE MG/DL
RBC # BLD AUTO: 4.24 10*6/MM3 (ref 3.77–5.28)
RBC # UR STRIP: NEGATIVE /UL
SODIUM SERPL-SCNC: 138 MMOL/L (ref 136–145)
SP GR UR: 1.02 (ref 1–1.03)
T4 FREE SERPL-MCNC: 1.16 NG/DL (ref 0.93–1.7)
TRIGL SERPL-MCNC: 95 MG/DL (ref 0–150)
TSH SERPL DL<=0.05 MIU/L-ACNC: 2.31 UIU/ML (ref 0.27–4.2)
UROBILINOGEN UR QL: NORMAL
VLDLC SERPL-MCNC: 17 MG/DL (ref 5–40)
WBC NRBC COR # BLD AUTO: 6.73 10*3/MM3 (ref 3.4–10.8)

## 2024-03-18 PROCEDURE — 85025 COMPLETE CBC W/AUTO DIFF WBC: CPT | Performed by: NURSE PRACTITIONER

## 2024-03-18 PROCEDURE — 83036 HEMOGLOBIN GLYCOSYLATED A1C: CPT | Performed by: NURSE PRACTITIONER

## 2024-03-18 PROCEDURE — 84439 ASSAY OF FREE THYROXINE: CPT | Performed by: NURSE PRACTITIONER

## 2024-03-18 PROCEDURE — 36415 COLL VENOUS BLD VENIPUNCTURE: CPT | Performed by: NURSE PRACTITIONER

## 2024-03-18 PROCEDURE — 82306 VITAMIN D 25 HYDROXY: CPT | Performed by: NURSE PRACTITIONER

## 2024-03-18 PROCEDURE — 84443 ASSAY THYROID STIM HORMONE: CPT | Performed by: NURSE PRACTITIONER

## 2024-03-18 PROCEDURE — 80053 COMPREHEN METABOLIC PANEL: CPT | Performed by: NURSE PRACTITIONER

## 2024-03-18 PROCEDURE — 80061 LIPID PANEL: CPT | Performed by: NURSE PRACTITIONER

## 2024-03-18 PROCEDURE — 86803 HEPATITIS C AB TEST: CPT | Performed by: NURSE PRACTITIONER

## 2024-03-18 RX ORDER — MELATONIN
25 DAILY
COMMUNITY

## 2024-03-18 NOTE — PROGRESS NOTES
Chief Complaint  Establish Care, Annual Exam, Flank Pain (Right side pain, hurts more when moving ), Numbness (In both arms , on and off through out the day ), Hypertension, and Gynecologic Exam    Subjective        Latasha Ruiz is here to establish care  History of Present Illness  Latasha is a 63-year-old female presenting today to establish care.    Pheochromocytoma:  Dx around 2002.  Had surgery.  In remission.    Right flank pain:  Started about a week ago.  Worse when she lays down and getting up in the AM  Feels the same as sciatica she had years ago.  Reports some urinary frequency  About a month ago had uti    Tingling both arms:  C/o neck pain  Intermittent tingling and numbness in both arms    Hypertension:  Was previously on amlodipine.  Stopped taking it.  Working on diet and exercise to control BP.    Hypothyroidism:  S/p right hemithyroidectomy  Was on levothyroxine, but stopped taking it.      Previous PCP: Dr. Paola Dodson  Specialist: Ortho: cannot remember name  Marital Status:   Children: 2  Occupation: retired  Exercise: does not exercise  Diet:  well balanced  Tobacco use: denies  Alcohol use: denies  Recreational drug use: denies    Routine Health Maintenance:  Dental: 2020  Vision: 2022  Colonoscopy: will order today  Pap Smear: 15-18 years ago. Will do today  Mammogram: due. Will order today  DEXA: will order today    Vaccines:  Pneumococcal Vaccine: declined  Shingrix: declined  Influenza: declined  COVID: declined  Tdap: will update today      The following portions of the patient's history were reviewed and updated as appropriate: allergies, current medications, past family history, past medical history, past social history, past surgical history and problem list.    No Known Allergies      Current Outpatient Medications:     Cholecalciferol 25 MCG (1000 UT) tablet, Take 1 tablet by mouth Daily., Disp: , Rfl:     cyclobenzaprine (FLEXERIL) 10 MG tablet, Take 1 tablet by mouth 3  "(Three) Times a Day As Needed for Muscle Spasms for up to 15 doses., Disp: 15 tablet, Rfl: 0    diclofenac (VOLTAREN) 50 MG EC tablet, Take 1 tablet by mouth 2 (Two) Times a Day As Needed (pain)., Disp: 60 tablet, Rfl: 3    Family History   Problem Relation Age of Onset    Breast cancer Mother         Past Medical History:   Diagnosis Date    Cancer     Ovarian Tumors removed       Social History     Socioeconomic History    Marital status:    Tobacco Use    Smoking status: Never     Passive exposure: Never    Smokeless tobacco: Never   Vaping Use    Vaping status: Never Used   Substance and Sexual Activity    Alcohol use: No    Drug use: No    Sexual activity: Defer        Past Surgical History:   Procedure Laterality Date    ADRENAL GLAND SURGERY Right 2002    EYE SURGERY Right     THYROID SURGERY  1999        Patient Active Problem List   Diagnosis    Pheochromocytoma    Other specified hypothyroidism       Immunization History   Administered Date(s) Administered    Tdap 03/18/2024         Objective   Vital Signs:  /96 (BP Location: Left arm, Patient Position: Sitting, Cuff Size: Large Adult)   Pulse 80   Temp 96.8 °F (36 °C) (Temporal)   Ht 160 cm (63\")   Wt 117 kg (259 lb)   SpO2 96%   BMI 45.88 kg/m²   Estimated body mass index is 45.88 kg/m² as calculated from the following:    Height as of this encounter: 160 cm (63\").    Weight as of this encounter: 117 kg (259 lb).             Review of Systems   Constitutional:  Negative for activity change, appetite change, chills, fatigue, fever and unexpected weight change.   HENT:  Negative for congestion, rhinorrhea, sneezing and sore throat.    Eyes:  Negative for visual disturbance.   Respiratory:  Negative for cough, shortness of breath and wheezing.    Cardiovascular:  Negative for chest pain.   Gastrointestinal:  Negative for abdominal pain, constipation, diarrhea, nausea and vomiting.   Genitourinary:  Negative for difficulty urinating and " dysuria.   Musculoskeletal:  Positive for back pain. Negative for arthralgias, gait problem and myalgias.   Skin:  Negative for color change, rash and wound.   Neurological:  Positive for numbness. Negative for dizziness, weakness, light-headedness and headaches.   Psychiatric/Behavioral:  Negative for self-injury, sleep disturbance and suicidal ideas. The patient is not nervous/anxious.      Physical Exam  Exam conducted with a chaperone present.   Constitutional:       Appearance: Normal appearance.   HENT:      Head: Normocephalic and atraumatic.      Right Ear: Tympanic membrane, ear canal and external ear normal.      Left Ear: Tympanic membrane, ear canal and external ear normal.      Nose: Nose normal.      Mouth/Throat:      Mouth: Mucous membranes are moist.      Pharynx: Oropharynx is clear.   Eyes:      Extraocular Movements: Extraocular movements intact.      Conjunctiva/sclera: Conjunctivae normal.      Pupils: Pupils are equal, round, and reactive to light.   Cardiovascular:      Rate and Rhythm: Normal rate and regular rhythm.      Pulses: Normal pulses.      Heart sounds: Normal heart sounds.   Pulmonary:      Effort: Pulmonary effort is normal.      Breath sounds: Normal breath sounds.   Abdominal:      General: Abdomen is flat. Bowel sounds are normal.      Palpations: Abdomen is soft.   Genitourinary:     General: Normal vulva.      Exam position: Lithotomy position.   Musculoskeletal:         General: Normal range of motion.      Cervical back: Normal range of motion and neck supple.   Skin:     General: Skin is warm and dry.      Capillary Refill: Capillary refill takes less than 2 seconds.   Neurological:      Mental Status: She is alert and oriented to person, place, and time.   Psychiatric:         Mood and Affect: Mood normal.         Behavior: Behavior normal.         Thought Content: Thought content normal.         Judgment: Judgment normal.        Result Review :                    Assessment and Plan   Diagnoses and all orders for this visit:    1. Preventative health care (Primary)  Comments:  Overall healthy 63-year-old female.  Tdap updated.  Declined shingles and pna vaccine  Labs ordered.  Follow-up 4 months  Orders:  -     Comprehensive Metabolic Panel  -     CBC & Differential    2. Hypothyroidism, unspecified type  Comments:  Labs ordered today.  Will start levothyroxine if needed.  Orders:  -     T4, Free  -     TSH    3. Screening for diabetes mellitus  -     Hemoglobin A1c    4. Encounter for vitamin deficiency screening  -     Vitamin D,25-Hydroxy    5. Need for hepatitis C screening test  -     Hepatitis C Antibody    6. Screening for lipid disorders  -     Lipid Panel    7. Colon cancer screening  -     Ambulatory Referral For Screening Colonoscopy    8. Screening mammogram for breast cancer  -     Mammo Screening Digital Tomosynthesis Bilateral With CAD; Future    9. Low back pain with sciatica, sciatica laterality unspecified, unspecified back pain laterality, unspecified chronicity  Comments:  UA normal.  May take diclofenac twice daily as needed.  Discussed PT. pt will consider  Orders:  -     POC Urinalysis Dipstick, Automated    10. Screening for osteoporosis  -     DEXA Bone Density Axial; Future    11. Need for diphtheria-tetanus-pertussis (Tdap) vaccine  -     Tdap Vaccine Greater Than or Equal To 6yo IM    12. Neck pain  Comments:  causing numbness/tingling stew arms  ct cervical spine  start diclofenac  will consider PT  Orders:  -     CT Cervical Spine Without Contrast; Future    13. Class 3 severe obesity with serious comorbidity and body mass index (BMI) of 45.0 to 49.9 in adult, unspecified obesity type  Comments:  Work on diet and exercise.  recommended Mediterranean diet.  Recommended least 150 minutes of physical activity per week.    14. Screening for cervical cancer  -     IGP,Aptima HPV,Age Gdln; Future    Other orders  -     diclofenac (VOLTAREN) 50 MG EC  tablet; Take 1 tablet by mouth 2 (Two) Times a Day As Needed (pain).  Dispense: 60 tablet; Refill: 3             Follow Up   Return in about 4 months (around 7/18/2024).  Patient was given instructions and counseling regarding her condition or for health maintenance advice. Please see specific information pulled into the AVS if appropriate.

## 2024-03-18 NOTE — PATIENT INSTRUCTIONS
Preoperative Evaluation      SUBJECTIVE:       Date of Exam: 1/11/2024     Evelyn Reed is a 42 y.o. female 1981 who presents for preoperative evaluation.   Procedure/Surgery: Gastric Bypass  Date of Procedure/Surgery: TBD  Surgeon: Dr. Polo  [facility] Facility: [facility]  Primary Physician: Lina Boyd, NP-C   Latex Allergy: No      Recent use of: No recent use of aspirin (ASA), NSAIDS or steroids    Tetanus up to date: unknown      Anesthesia Complications: No  History of abnormal bleeding : No  History of Blood Transfusions: No  Health Care Directive or Living Will: Yes, scanned    Review of Systems   Constitutional:  Negative for chills and fever.   Respiratory:  Negative for chest tightness and shortness of breath.    Cardiovascular:  Negative for chest pain, palpitations and leg swelling.   Gastrointestinal:  Negative for abdominal pain, diarrhea, nausea and vomiting.   Genitourinary:  Negative for dysuria, frequency and urgency.   Skin:  Negative for rash.   Neurological:  Negative for dizziness, weakness and light-headedness.     OBJECTIVE:   /74 (Site: Left Upper Arm, Position: Sitting)   Pulse 83   Temp 98 °F (36.7 °C) (Temporal)   Resp 16   Wt 124.3 kg (274 lb)   SpO2 98%   BMI 45.60 kg/m²     Physical Exam  Vitals and nursing note reviewed.   Constitutional:       Appearance: Normal appearance.   HENT:      Head: Normocephalic and atraumatic.      Mouth/Throat:      Mouth: Mucous membranes are moist.      Pharynx: Oropharynx is clear. No oropharyngeal exudate or posterior oropharyngeal erythema.   Eyes:      Extraocular Movements: Extraocular movements intact.      Conjunctiva/sclera: Conjunctivae normal.      Pupils: Pupils are equal, round, and reactive to light.   Cardiovascular:      Rate and Rhythm: Normal rate and regular rhythm.   Pulmonary:      Effort: Pulmonary effort is normal. No respiratory distress.      Breath sounds: No wheezing, rhonchi or rales.  Dexa scan, mammogram, and colonoscopy ordered today.  Pap performed.  Numbness likely due to pinched nerve in neck/upper back.  Urine was normal. You are likely dealing with sciatica.    for health care of the primary learner? ENGLISH    How does the primary learner prefer to learn new concepts? DEMONSTRATION    Answered By patient    Relationship to Learner SELF         Fall Risk       No data to display                   Travel Screening:    Travel Screening     No screening recorded since 01/10/24 0000       Travel History   Travel since 12/11/23    No documented travel since 12/11/23          Health Maintenance reviewed and discussed and ordered per Provider.  Social Determinants of Health     Tobacco Use: Low Risk  (12/26/2023)    Patient History     Smoking Tobacco Use: Never     Smokeless Tobacco Use: Never     Passive Exposure: Never   Alcohol Use: Not on file   Financial Resource Strain: Low Risk  (9/11/2023)    Overall Financial Resource Strain (CARDIA)     Difficulty of Paying Living Expenses: Not hard at all   Food Insecurity: Not on file (9/11/2023)   Transportation Needs: Unknown (9/11/2023)    PRAPARE - Transportation     Lack of Transportation (Medical): Not on file     Lack of Transportation (Non-Medical): No   Physical Activity: Not on file   Stress: Not on file   Social Connections: Not on file   Intimate Partner Violence: Not on file   Depression: Not at risk (12/11/2023)    PHQ-2     PHQ-2 Score: 0   Housing Stability: Unknown (9/11/2023)    Housing Stability Vital Sign     Unable to Pay for Housing in the Last Year: Not on file     Number of Places Lived in the Last Year: Not on file     Unstable Housing in the Last Year: No   Interpersonal Safety: Not on file   Utilities: Not on file        Health Maintenance Due   Topic Date Due    Hepatitis B vaccine (1 of 3 - 3-dose series) Never done    COVID-19 Vaccine (1) Never done    Cervical cancer screen  Never done   .      Coordination of Care:  1. Have you been to the ER, urgent care clinic since your last visit? Hospitalized since your last visit? no    2. Have you seen or consulted any other health care providers outside of the

## 2024-03-21 LAB
AGE GDLN ACOG TESTING: NORMAL
CYTOLOGIST CVX/VAG CYTO: NORMAL
CYTOLOGY CVX/VAG DOC CYTO: NORMAL
CYTOLOGY CVX/VAG DOC THIN PREP: NORMAL
DX ICD CODE: NORMAL
HPV GENOTYPE REFLEX: NORMAL
HPV I/H RISK 4 DNA CVX QL PROBE+SIG AMP: NEGATIVE
Lab: NORMAL
OTHER STN SPEC: NORMAL
STAT OF ADQ CVX/VAG CYTO-IMP: NORMAL

## 2024-03-22 ENCOUNTER — PATIENT ROUNDING (BHMG ONLY) (OUTPATIENT)
Dept: FAMILY MEDICINE CLINIC | Facility: CLINIC | Age: 64
End: 2024-03-22
Payer: MEDICARE

## 2024-03-27 ENCOUNTER — READMISSION MANAGEMENT (OUTPATIENT)
Dept: CALL CENTER | Facility: HOSPITAL | Age: 64
End: 2024-03-27
Payer: MEDICARE

## 2024-03-27 ENCOUNTER — APPOINTMENT (OUTPATIENT)
Dept: GENERAL RADIOLOGY | Facility: HOSPITAL | Age: 64
End: 2024-03-27
Payer: MEDICARE

## 2024-03-27 ENCOUNTER — APPOINTMENT (OUTPATIENT)
Dept: CT IMAGING | Facility: HOSPITAL | Age: 64
End: 2024-03-27
Payer: MEDICARE

## 2024-03-27 ENCOUNTER — APPOINTMENT (OUTPATIENT)
Dept: CARDIOLOGY | Facility: HOSPITAL | Age: 64
End: 2024-03-27
Payer: MEDICARE

## 2024-03-27 ENCOUNTER — HOSPITAL ENCOUNTER (OUTPATIENT)
Facility: HOSPITAL | Age: 64
Setting detail: OBSERVATION
Discharge: HOME | End: 2024-03-27
Attending: EMERGENCY MEDICINE | Admitting: EMERGENCY MEDICINE
Payer: MEDICARE

## 2024-03-27 VITALS
BODY MASS INDEX: 46.48 KG/M2 | TEMPERATURE: 97.9 F | HEIGHT: 63 IN | RESPIRATION RATE: 16 BRPM | SYSTOLIC BLOOD PRESSURE: 156 MMHG | HEART RATE: 89 BPM | WEIGHT: 262.35 LBS | DIASTOLIC BLOOD PRESSURE: 88 MMHG | OXYGEN SATURATION: 98 %

## 2024-03-27 DIAGNOSIS — M54.9 INTRACTABLE BACK PAIN: Primary | ICD-10-CM

## 2024-03-27 LAB
ANION GAP SERPL CALCULATED.3IONS-SCNC: 10 MMOL/L (ref 5–15)
BASOPHILS # BLD AUTO: 0.05 10*3/MM3 (ref 0–0.2)
BASOPHILS NFR BLD AUTO: 0.8 % (ref 0–1.5)
BH CV LOWER VASCULAR LEFT COMMON FEMORAL AUGMENT: NORMAL
BH CV LOWER VASCULAR LEFT COMMON FEMORAL COMPETENT: NORMAL
BH CV LOWER VASCULAR LEFT COMMON FEMORAL COMPRESS: NORMAL
BH CV LOWER VASCULAR LEFT COMMON FEMORAL PHASIC: NORMAL
BH CV LOWER VASCULAR LEFT COMMON FEMORAL SPONT: NORMAL
BH CV LOWER VASCULAR RIGHT COMMON FEMORAL AUGMENT: NORMAL
BH CV LOWER VASCULAR RIGHT COMMON FEMORAL COMPETENT: NORMAL
BH CV LOWER VASCULAR RIGHT COMMON FEMORAL COMPRESS: NORMAL
BH CV LOWER VASCULAR RIGHT COMMON FEMORAL PHASIC: NORMAL
BH CV LOWER VASCULAR RIGHT COMMON FEMORAL SPONT: NORMAL
BH CV LOWER VASCULAR RIGHT DISTAL FEMORAL COMPRESS: NORMAL
BH CV LOWER VASCULAR RIGHT GASTRONEMIUS COMPRESS: NORMAL
BH CV LOWER VASCULAR RIGHT GREATER SAPH AK COMPRESS: NORMAL
BH CV LOWER VASCULAR RIGHT GREATER SAPH BK COMPRESS: NORMAL
BH CV LOWER VASCULAR RIGHT LESSER SAPH COMPRESS: NORMAL
BH CV LOWER VASCULAR RIGHT MID FEMORAL AUGMENT: NORMAL
BH CV LOWER VASCULAR RIGHT MID FEMORAL COMPETENT: NORMAL
BH CV LOWER VASCULAR RIGHT MID FEMORAL COMPRESS: NORMAL
BH CV LOWER VASCULAR RIGHT MID FEMORAL PHASIC: NORMAL
BH CV LOWER VASCULAR RIGHT MID FEMORAL SPONT: NORMAL
BH CV LOWER VASCULAR RIGHT PERONEAL COMPRESS: NORMAL
BH CV LOWER VASCULAR RIGHT POPLITEAL AUGMENT: NORMAL
BH CV LOWER VASCULAR RIGHT POPLITEAL COMPETENT: NORMAL
BH CV LOWER VASCULAR RIGHT POPLITEAL COMPRESS: NORMAL
BH CV LOWER VASCULAR RIGHT POPLITEAL PHASIC: NORMAL
BH CV LOWER VASCULAR RIGHT POPLITEAL SPONT: NORMAL
BH CV LOWER VASCULAR RIGHT POSTERIOR TIBIAL COMPRESS: NORMAL
BH CV LOWER VASCULAR RIGHT PROXIMAL FEMORAL COMPRESS: NORMAL
BH CV LOWER VASCULAR RIGHT SAPHENOFEMORAL JUNCTION COMPRESS: NORMAL
BH CV VAS PRELIMINARY FINDINGS SCRIPTING: 1
BUN SERPL-MCNC: 12 MG/DL (ref 8–23)
BUN/CREAT SERPL: 16.2 (ref 7–25)
CALCIUM SPEC-SCNC: 9.1 MG/DL (ref 8.6–10.5)
CHLORIDE SERPL-SCNC: 103 MMOL/L (ref 98–107)
CO2 SERPL-SCNC: 27 MMOL/L (ref 22–29)
CREAT SERPL-MCNC: 0.74 MG/DL (ref 0.57–1)
DEPRECATED RDW RBC AUTO: 44.7 FL (ref 37–54)
EGFRCR SERPLBLD CKD-EPI 2021: 89.9 ML/MIN/1.73
EOSINOPHIL # BLD AUTO: 0.33 10*3/MM3 (ref 0–0.4)
EOSINOPHIL NFR BLD AUTO: 5.2 % (ref 0.3–6.2)
ERYTHROCYTE [DISTWIDTH] IN BLOOD BY AUTOMATED COUNT: 12.1 % (ref 12.3–15.4)
GLUCOSE SERPL-MCNC: 104 MG/DL (ref 65–99)
HCT VFR BLD AUTO: 41.4 % (ref 34–46.6)
HGB BLD-MCNC: 13 G/DL (ref 12–15.9)
HOLD SPECIMEN: NORMAL
HOLD SPECIMEN: NORMAL
IMM GRANULOCYTES # BLD AUTO: 0.02 10*3/MM3 (ref 0–0.05)
IMM GRANULOCYTES NFR BLD AUTO: 0.3 % (ref 0–0.5)
LYMPHOCYTES # BLD AUTO: 2.06 10*3/MM3 (ref 0.7–3.1)
LYMPHOCYTES NFR BLD AUTO: 32.3 % (ref 19.6–45.3)
MCH RBC QN AUTO: 31.3 PG (ref 26.6–33)
MCHC RBC AUTO-ENTMCNC: 31.4 G/DL (ref 31.5–35.7)
MCV RBC AUTO: 99.8 FL (ref 79–97)
MONOCYTES # BLD AUTO: 0.41 10*3/MM3 (ref 0.1–0.9)
MONOCYTES NFR BLD AUTO: 6.4 % (ref 5–12)
NEUTROPHILS NFR BLD AUTO: 3.51 10*3/MM3 (ref 1.7–7)
NEUTROPHILS NFR BLD AUTO: 55 % (ref 42.7–76)
NRBC BLD AUTO-RTO: 0 /100 WBC (ref 0–0.2)
PLATELET # BLD AUTO: 282 10*3/MM3 (ref 140–450)
PMV BLD AUTO: 10 FL (ref 6–12)
POTASSIUM SERPL-SCNC: 4.2 MMOL/L (ref 3.5–5.2)
RBC # BLD AUTO: 4.15 10*6/MM3 (ref 3.77–5.28)
SODIUM SERPL-SCNC: 140 MMOL/L (ref 136–145)
WBC NRBC COR # BLD AUTO: 6.38 10*3/MM3 (ref 3.4–10.8)
WHOLE BLOOD HOLD COAG: NORMAL
WHOLE BLOOD HOLD SPECIMEN: NORMAL

## 2024-03-27 PROCEDURE — 25010000002 KETOROLAC TROMETHAMINE PER 15 MG: Performed by: PHYSICIAN ASSISTANT

## 2024-03-27 PROCEDURE — 25010000002 ONDANSETRON PER 1 MG: Performed by: EMERGENCY MEDICINE

## 2024-03-27 PROCEDURE — 96374 THER/PROPH/DIAG INJ IV PUSH: CPT

## 2024-03-27 PROCEDURE — 73502 X-RAY EXAM HIP UNI 2-3 VIEWS: CPT

## 2024-03-27 PROCEDURE — G0378 HOSPITAL OBSERVATION PER HR: HCPCS

## 2024-03-27 PROCEDURE — 93971 EXTREMITY STUDY: CPT

## 2024-03-27 PROCEDURE — 99203 OFFICE O/P NEW LOW 30 MIN: CPT | Performed by: NURSE PRACTITIONER

## 2024-03-27 PROCEDURE — 85025 COMPLETE CBC W/AUTO DIFF WBC: CPT | Performed by: EMERGENCY MEDICINE

## 2024-03-27 PROCEDURE — 80048 BASIC METABOLIC PNL TOTAL CA: CPT | Performed by: EMERGENCY MEDICINE

## 2024-03-27 PROCEDURE — 96375 TX/PRO/DX INJ NEW DRUG ADDON: CPT

## 2024-03-27 PROCEDURE — 72131 CT LUMBAR SPINE W/O DYE: CPT

## 2024-03-27 PROCEDURE — 36415 COLL VENOUS BLD VENIPUNCTURE: CPT

## 2024-03-27 PROCEDURE — 97161 PT EVAL LOW COMPLEX 20 MIN: CPT

## 2024-03-27 PROCEDURE — 99285 EMERGENCY DEPT VISIT HI MDM: CPT

## 2024-03-27 PROCEDURE — 93971 EXTREMITY STUDY: CPT | Performed by: SURGERY

## 2024-03-27 PROCEDURE — 25010000002 HYDROMORPHONE 1 MG/ML SOLUTION: Performed by: EMERGENCY MEDICINE

## 2024-03-27 RX ORDER — ACETAMINOPHEN 325 MG/1
650 TABLET ORAL EVERY 4 HOURS PRN
Status: DISCONTINUED | OUTPATIENT
Start: 2024-03-27 | End: 2024-03-27 | Stop reason: HOSPADM

## 2024-03-27 RX ORDER — CYCLOBENZAPRINE HCL 10 MG
10 TABLET ORAL 3 TIMES DAILY PRN
Status: DISCONTINUED | OUTPATIENT
Start: 2024-03-27 | End: 2024-03-27 | Stop reason: HOSPADM

## 2024-03-27 RX ORDER — AMOXICILLIN 250 MG
2 CAPSULE ORAL 2 TIMES DAILY PRN
Status: DISCONTINUED | OUTPATIENT
Start: 2024-03-27 | End: 2024-03-27 | Stop reason: HOSPADM

## 2024-03-27 RX ORDER — SODIUM CHLORIDE 0.9 % (FLUSH) 0.9 %
10 SYRINGE (ML) INJECTION AS NEEDED
Status: DISCONTINUED | OUTPATIENT
Start: 2024-03-27 | End: 2024-03-27 | Stop reason: HOSPADM

## 2024-03-27 RX ORDER — CHOLECALCIFEROL (VITAMIN D3) 125 MCG
5 CAPSULE ORAL NIGHTLY PRN
Status: DISCONTINUED | OUTPATIENT
Start: 2024-03-27 | End: 2024-03-27 | Stop reason: HOSPADM

## 2024-03-27 RX ORDER — BISACODYL 10 MG
10 SUPPOSITORY, RECTAL RECTAL DAILY PRN
Status: DISCONTINUED | OUTPATIENT
Start: 2024-03-27 | End: 2024-03-27 | Stop reason: HOSPADM

## 2024-03-27 RX ORDER — NITROGLYCERIN 0.4 MG/1
0.4 TABLET SUBLINGUAL
Status: DISCONTINUED | OUTPATIENT
Start: 2024-03-27 | End: 2024-03-27 | Stop reason: HOSPADM

## 2024-03-27 RX ORDER — BISACODYL 5 MG/1
5 TABLET, DELAYED RELEASE ORAL DAILY PRN
Status: DISCONTINUED | OUTPATIENT
Start: 2024-03-27 | End: 2024-03-27 | Stop reason: HOSPADM

## 2024-03-27 RX ORDER — IBUPROFEN 400 MG/1
400 TABLET ORAL DAILY PRN
COMMUNITY
End: 2024-03-27 | Stop reason: HOSPADM

## 2024-03-27 RX ORDER — HYDROCODONE BITARTRATE AND ACETAMINOPHEN 7.5; 325 MG/1; MG/1
1 TABLET ORAL EVERY 6 HOURS PRN
Qty: 12 TABLET | Refills: 0 | Status: SHIPPED | OUTPATIENT
Start: 2024-03-27 | End: 2024-04-02 | Stop reason: SDUPTHER

## 2024-03-27 RX ORDER — ONDANSETRON 2 MG/ML
4 INJECTION INTRAMUSCULAR; INTRAVENOUS ONCE
Status: COMPLETED | OUTPATIENT
Start: 2024-03-27 | End: 2024-03-27

## 2024-03-27 RX ORDER — KETOROLAC TROMETHAMINE 30 MG/ML
15 INJECTION, SOLUTION INTRAMUSCULAR; INTRAVENOUS EVERY 6 HOURS PRN
Status: DISCONTINUED | OUTPATIENT
Start: 2024-03-27 | End: 2024-03-27 | Stop reason: HOSPADM

## 2024-03-27 RX ORDER — ONDANSETRON 2 MG/ML
4 INJECTION INTRAMUSCULAR; INTRAVENOUS EVERY 6 HOURS PRN
Status: DISCONTINUED | OUTPATIENT
Start: 2024-03-27 | End: 2024-03-27 | Stop reason: HOSPADM

## 2024-03-27 RX ORDER — SODIUM CHLORIDE 9 MG/ML
40 INJECTION, SOLUTION INTRAVENOUS AS NEEDED
Status: DISCONTINUED | OUTPATIENT
Start: 2024-03-27 | End: 2024-03-27 | Stop reason: HOSPADM

## 2024-03-27 RX ORDER — PREDNISONE 20 MG/1
40 TABLET ORAL
Status: DISCONTINUED | OUTPATIENT
Start: 2024-03-28 | End: 2024-03-27 | Stop reason: HOSPADM

## 2024-03-27 RX ORDER — HYDROCODONE BITARTRATE AND ACETAMINOPHEN 7.5; 325 MG/1; MG/1
1 TABLET ORAL EVERY 6 HOURS PRN
Status: DISCONTINUED | OUTPATIENT
Start: 2024-03-27 | End: 2024-03-27 | Stop reason: HOSPADM

## 2024-03-27 RX ORDER — PREDNISONE 10 MG/1
TABLET ORAL
Qty: 20 TABLET | Refills: 0 | Status: SHIPPED | OUTPATIENT
Start: 2024-03-27 | End: 2024-04-04

## 2024-03-27 RX ORDER — SODIUM CHLORIDE 0.9 % (FLUSH) 0.9 %
10 SYRINGE (ML) INJECTION EVERY 12 HOURS SCHEDULED
Status: DISCONTINUED | OUTPATIENT
Start: 2024-03-27 | End: 2024-03-27 | Stop reason: HOSPADM

## 2024-03-27 RX ORDER — POLYETHYLENE GLYCOL 3350 17 G/17G
17 POWDER, FOR SOLUTION ORAL DAILY PRN
Status: DISCONTINUED | OUTPATIENT
Start: 2024-03-27 | End: 2024-03-27 | Stop reason: HOSPADM

## 2024-03-27 RX ORDER — LIDOCAINE 4 G/G
1 PATCH TOPICAL
Status: DISCONTINUED | OUTPATIENT
Start: 2024-03-27 | End: 2024-03-27 | Stop reason: HOSPADM

## 2024-03-27 RX ADMIN — CYCLOBENZAPRINE 10 MG: 10 TABLET, FILM COATED ORAL at 13:27

## 2024-03-27 RX ADMIN — KETOROLAC TROMETHAMINE 15 MG: 30 INJECTION, SOLUTION INTRAMUSCULAR at 10:28

## 2024-03-27 RX ADMIN — HYDROMORPHONE HYDROCHLORIDE 0.5 MG: 1 INJECTION, SOLUTION INTRAMUSCULAR; INTRAVENOUS; SUBCUTANEOUS at 04:56

## 2024-03-27 RX ADMIN — LIDOCAINE 1 PATCH: 4 PATCH TOPICAL at 10:28

## 2024-03-27 RX ADMIN — HYDROMORPHONE HYDROCHLORIDE 0.5 MG: 1 INJECTION, SOLUTION INTRAMUSCULAR; INTRAVENOUS; SUBCUTANEOUS at 06:57

## 2024-03-27 RX ADMIN — Medication 10 ML: at 13:28

## 2024-03-27 RX ADMIN — ONDANSETRON 4 MG: 2 INJECTION INTRAMUSCULAR; INTRAVENOUS at 04:56

## 2024-03-27 NOTE — OUTREACH NOTE
Prep Survey      Flowsheet Row Responses   [facility] patient discharged from? [facility]   Is LACE score < 7 ? Yes   Eligibility [facility]   Date of Admission 03/27/24   Date of Discharge 03/27/24   Discharge Disposition Home or Self Care   Discharge diagnosis Intractable back pain   Does the patient have one of the following disease processes/diagnoses(primary or secondary)? Other   Does the patient have Home health ordered? No   Is there a DME ordered? Yes   What DME was ordered? walker from [facility]   Prep survey completed? Yes            Radha RAY - Registered Nurse

## 2024-03-27 NOTE — DISCHARGE PLACEMENT REQUEST
"Latasha Ruiz (63 y.o. Female)       Date of Birth   1960    Social Security Number       Address   [address] [place] [address]    Home Phone   [phone]    MRN   [id number]       Voodoo   None    Marital Status                               Admission Date   3/27/24    Admission Type   Emergency    Admitting Provider   Fan Gomez DO    Attending Provider   Fan Gomez DO    Department, Room/Bed   [facility] OBSERVATION, 114/1       Discharge Date       Discharge Disposition       Discharge Destination                                 Attending Provider: Fan Gomez DO    Allergies: No Known Allergies    Isolation: None   Infection: None   Code Status: CPR    Ht: 160 cm (63\")   Wt: 119 kg (262 lb 5.6 oz)    Admission Cmt: None   Principal Problem: Intractable back pain [M54.9]                   Active Insurance as of 3/27/2024       Primary Coverage       Payor Plan Insurance Group Employer/Plan Group    HUMANA MEDICARE REPLACEMENT HUMANA MEDICARE REPLACEMENT [id number]       Payor Plan Address Payor Plan Phone Number Payor Plan Fax Number Effective Dates    [address] [address] [phone]  12/1/2018 - None Entered    [facility] [id number]         Subscriber Name Subscriber Birth Date Member ID       LATASHA RUIZ 1960 [id number]                     Emergency Contacts        (Rel.) Home Phone Work Phone Mobile Phone    IJEOMA RUZI (Spouse) -- -- [phone]                "

## 2024-03-27 NOTE — H&P
[facility] Observation Unit H&P    Patient Name: Latasha Ruiz  : 1960  MRN: [id number]  Primary Care Physician: Sophy Canchola APRN  Date of admission: 3/27/2024     Patient Care Team:  Sophy Canchola APRN as PCP - General (Nurse Practitioner)          Subjective   History Present Illness     Chief Complaint:   Chief Complaint   Patient presents with    Hip Pain     Right     History of Present Illness  Obtained from admitting physician HPI on 3/27/2024:  Chief complaint: Patient is a 63-year-old who on the  of the month began having pain in her right lower back.  It radiates around to her right hip.  She cannot walk anymore without severe pain and being hunched over.  She is feeling some numbness going down her leg.  She did not injure herself.  This started abruptly and has progressively worsened.  With the worsening symptoms she presented here.  There is been no loss of bowel or bladder.  She has had no fever.  No weakness.     24 (postobservation admission):  Patient confirms the HPI noted above including pain which has been present and worsening for approximately the past 8 days with a sharp pain also described as a tightness in the lower portion of her back rating towards her right hip and leg.  She denies any chest pain, dyspnea, fever or weakness.  Pain is significantly worse with attempts at ambulation.        Review of Systems   Constitutional: Negative.   HENT: Negative.     Eyes: Negative.    Cardiovascular: Negative.    Respiratory: Negative.     Skin: Negative.    Musculoskeletal:  Positive for back pain and joint pain.   Gastrointestinal: Negative.    Genitourinary: Negative.    Neurological: Negative.    Psychiatric/Behavioral: Negative.           Personal History     Past Medical History:   Past Medical History:   Diagnosis Date    Cancer     Ovarian Tumors removed       Surgical History:      Past Surgical History:   Procedure Laterality Date    ADRENAL GLAND SURGERY Right 2002    EYE  SURGERY Right     THYROID SURGERY  1999           Family History: family history includes Breast cancer in her mother. Otherwise pertinent FHx was reviewed and unremarkable.     Social History:  reports that she has never smoked. She has never been exposed to tobacco smoke. She has never used smokeless tobacco. She reports that she does not drink alcohol and does not use drugs.      Medications:  Prior to Admission medications    Medication Sig Start Date End Date Taking? Authorizing Provider   Cholecalciferol 25 MCG (1000 UT) tablet Take 1 tablet by mouth Daily.   Yes Carlos Hernandez MD   diclofenac (VOLTAREN) 50 MG EC tablet Take 1 tablet by mouth 2 (Two) Times a Day As Needed (pain). 3/18/24  Yes Sophy Canchola APRN   ibuprofen (ADVIL,MOTRIN) 400 MG tablet Take 1 tablet by mouth Daily As Needed for Mild Pain.   Yes Provider, MD Carlos   cyclobenzaprine (FLEXERIL) 10 MG tablet Take 1 tablet by mouth 3 (Three) Times a Day As Needed for Muscle Spasms for up to 15 doses. 1/19/22 3/27/24  Nevaeh Baker APRN       Allergies:  No Known Allergies    Objective   Objective     Vital Signs  Temp:  [97.4 °F (36.3 °C)-97.9 °F (36.6 °C)] 97.5 °F (36.4 °C)  Heart Rate:  [] 73  Resp:  [16-18] 16  BP: (115-158)/(71-89) 128/80  SpO2:  [93 %-100 %] 98 %  on   ;   Device (Oxygen Therapy): room air  Body mass index is 46.47 kg/m².    Physical Exam  Constitutional:       General: She is not in acute distress.     Appearance: Normal appearance. She is obese. She is not ill-appearing, toxic-appearing or diaphoretic.   HENT:      Head: Normocephalic.      Right Ear: External ear normal.      Left Ear: External ear normal.      Nose: Nose normal.      Mouth/Throat:      Mouth: Mucous membranes are moist.   Eyes:      Extraocular Movements: Extraocular movements intact.   Cardiovascular:      Rate and Rhythm: Normal rate and regular rhythm.      Pulses: Normal pulses.   Pulmonary:      Effort: Pulmonary effort is normal.       Breath sounds: Normal breath sounds.   Abdominal:      General: Bowel sounds are normal.      Palpations: Abdomen is soft.   Musculoskeletal:      Cervical back: Normal range of motion.      Right lower leg: No edema.      Left lower leg: No edema.   Skin:     General: Skin is warm and dry.      Capillary Refill: Capillary refill takes less than 2 seconds.   Neurological:      General: No focal deficit present.      Mental Status: She is alert and oriented to person, place, and time.      Motor: Weakness present.      Comments: Patient appears to have strength in lower extremities though seems somewhat guarded when testing against resistance on the right making comparison somewhat difficult   Psychiatric:         Mood and Affect: Mood normal.         Behavior: Behavior normal.         Thought Content: Thought content normal.         Judgment: Judgment normal.       Results Review:  I have personally reviewed most recent lab results and radiology images and interpretations and agree with findings, most notably: BMP, CBC, x-ray of hip and CT of lumbar spine.    Results from last 7 days   Lab Units 03/27/24  0417   WBC 10*3/mm3 6.38   HEMOGLOBIN g/dL 13.0   HEMATOCRIT % 41.4   PLATELETS 10*3/mm3 282     Results from last 7 days   Lab Units 03/27/24  0417   SODIUM mmol/L 140   POTASSIUM mmol/L 4.2   CHLORIDE mmol/L 103   CO2 mmol/L 27.0   BUN mg/dL 12   CREATININE mg/dL 0.74   GLUCOSE mg/dL 104*   CALCIUM mg/dL 9.1     Estimated Creatinine Clearance: 94.5 mL/min (by C-G formula based on SCr of 0.74 mg/dL).  Brief Urine Lab Results  (Last result in the past 365 days)        Color   Clarity   Blood   Leuk Est   Nitrite   Protein   CREAT   Urine HCG        03/18/24 0903 Yellow   Clear   Negative   Negative   Negative   Negative                   Microbiology Results (last 10 days)       ** No results found for the last 240 hours. **            ECG/EMG Results (most recent)       None            Results for orders placed  during the hospital encounter of 03/27/24    Duplex Venous Lower Extremity - Right    Interpretation Summary    Normal right lower extremity venous duplex scan.          CT Lumbar Spine Without Contrast    Result Date: 3/27/2024  Impression: 1.No acute osseous abnormality. 2.Moderate lumbar spondylosis with varying degrees of neural foraminal and spinal canal narrowing, most pronounced at L4-L5. 3.Mild DJD at the sacroiliac joints. 4.Colonic diverticulosis. Electronically Signed: Brian Ha MD  3/27/2024 5:38 AM EDT  Workstation ID: [id number]    XR Hip With or Without Pelvis 2 - 3 View Right    Result Date: 3/27/2024  Impression: Mild degenerative changes without acute osseous abnormality. Electronically Signed: Brian Ha MD  3/27/2024 5:07 AM EDT  Workstation ID: [id number]       Estimated Creatinine Clearance: 94.5 mL/min (by C-G formula based on SCr of 0.74 mg/dL).    Assessment & Plan   Assessment/Plan       Active Hospital Problems    Diagnosis  POA    **Intractable back pain [M54.9]  Yes      Resolved Hospital Problems   No resolved problems to display.       Back pain  -CBC and CMP unremarkable  -X-ray of hip and pelvis shows mild degenerative changes without acute osseous abnormality  -CT of lumbar spine showed no acute osseous abnormalities with moderate lumbar spondylolysis with varying degrees of neuroforaminal and spinal canal narrowing most pronounced at L4-L5 with mild degenerative joint disease at the sacroiliac joint and colonic diverticulosis  -Venous duplex ultrasound of right lower extremity normal  -In the ED patient was given 0.5 mg Dilaudid x 2 as well as Zofran  -Neurosurgery consulted in ED  -Hydrocodone, prednisone, Toradol, Flexeril and Lidoderm ordered  -May benefit from PT consult and possible pain management referral based on recommendations from neurosurgery and clinical course     Morbid obesity (BMI: 46.47)  -Encourage diet lifestyle modifications          VTE Prophylaxis -    Mechanical Order History:        Ordered        03/27/24 0726  Place Sequential Compression Device  Once            03/27/24 0726  Maintain Sequential Compression Device  Continuous                          Pharmalogical Order History:       None            CODE STATUS:    Code Status and Medical Interventions:   Ordered at: 03/27/24 0602     Level Of Support Discussed With:    Patient     Code Status (Patient has no pulse and is not breathing):    CPR (Attempt to Resuscitate)     Medical Interventions (Patient has pulse or is breathing):    Full Support       This patient has been examined wearing personal protective equipment.     I discussed the patient's findings and my recommendations with patient and nursing staff.      Signature:Electronically signed by aJiro Viera PA-C, 03/27/24, 1:44 PM EDT.

## 2024-03-27 NOTE — CASE MANAGEMENT/SOCIAL WORK
Continued Stay Note  [facility]     Patient Name: Latasha Ruiz  MRN: [id number]  Today's Date: 3/27/2024    Admit Date: 3/27/2024    Plan: Home w/ OPPT- Referral sent to [facility] (order in). [facility]- Walker (order placed).   Discharge Plan       Row Name 03/27/24 1518       Plan    Plan Home w/ OPPT- Referral sent to [facility] (order in). [facility]- Walker (order placed).    Plan Comments CM notified by PT that patient will need a walker and OPPT. CM took patient OPPT list and was given the choice of [facility]-placed referral in basket, obtained order, faxed DCP/order to [facility]. CM placed referral to [facility] for walker. Patient will DC this afternoon.           Toy Coulter RN    Cell number [phone]  Office number [phone]

## 2024-03-27 NOTE — DISCHARGE SUMMARY
[facility]    Sophy Canchola APRN    CHIEF COMPLAINT:     Back pain with radiculopathy    HISTORY OF PRESENT ILLNESS:    Obtained from admitting physician HPI on 3/27/2024:  Chief complaint: Patient is a 63-year-old who on the 19th of the month began having pain in her right lower back.  It radiates around to her right hip.  She cannot walk anymore without severe pain and being hunched over.  She is feeling some numbness going down her leg.  She did not injure herself.  This started abruptly and has progressively worsened.  With the worsening symptoms she presented here.  There is been no loss of bowel or bladder.  She has had no fever.  No weakness.    03/27/24 (postobservation admission):  Patient confirms the HPI noted above including pain which has been present and worsening for approximately the past 8 days with a sharp pain also described as a tightness in the lower portion of her back rating towards her right hip and leg.  She denies any chest pain, dyspnea, fever or weakness.  Pain is significantly worse with attempts at ambulation.                Past Medical History:   Diagnosis Date    Cancer     Ovarian Tumors removed     Past Surgical History:   Procedure Laterality Date    ADRENAL GLAND SURGERY Right 2002    EYE SURGERY Right     THYROID SURGERY  1999     Family History   Problem Relation Age of Onset    Breast cancer Mother      Social History     Tobacco Use    Smoking status: Never     Passive exposure: Never    Smokeless tobacco: Never   Vaping Use    Vaping status: Never Used   Substance Use Topics    Alcohol use: No    Drug use: No     Medications Prior to Admission   Medication Sig Dispense Refill Last Dose    Cholecalciferol 25 MCG (1000 UT) tablet Take 1 tablet by mouth Daily.       diclofenac (VOLTAREN) 50 MG EC tablet Take 1 tablet by mouth 2 (Two) Times a Day As Needed (pain). 60 tablet 3     ibuprofen (ADVIL,MOTRIN) 400 MG tablet Take 1 tablet by mouth Daily As  Needed for Mild Pain.        Allergies:  Patient has no known allergies.    Immunization History   Administered Date(s) Administered    Tdap 03/18/2024           REVIEW OF SYSTEMS:    Review of Systems   Constitutional: Negative.   HENT: Negative.     Eyes: Negative.    Cardiovascular: Negative.    Respiratory: Negative.     Skin: Negative.    Musculoskeletal:  Positive for back pain and joint pain.   Gastrointestinal: Negative.    Genitourinary: Negative.    Neurological: Negative.    Psychiatric/Behavioral: Negative.      Vital Signs  Temp:  [97.4 °F (36.3 °C)-97.9 °F (36.6 °C)] 97.4 °F (36.3 °C)  Heart Rate:  [] 76  Resp:  [16-18] 16  BP: (115-158)/(71-89) 120/73          Physical Exam:  Constitutional:       General: She is not in acute distress.     Appearance: Normal appearance. She is obese. She is not ill-appearing, toxic-appearing or diaphoretic.   HENT:      Head: Normocephalic.      Right Ear: External ear normal.      Left Ear: External ear normal.      Nose: Nose normal.      Mouth/Throat:      Mouth: Mucous membranes are moist.   Eyes:      Extraocular Movements: Extraocular movements intact.   Cardiovascular:      Rate and Rhythm: Normal rate and regular rhythm.      Pulses: Normal pulses.   Pulmonary:      Effort: Pulmonary effort is normal.      Breath sounds: Normal breath sounds.   Abdominal:      General: Bowel sounds are normal.      Palpations: Abdomen is soft.   Musculoskeletal:      Cervical back: Normal range of motion.      Right lower leg: No edema.      Left lower leg: No edema.   Skin:     General: Skin is warm and dry.      Capillary Refill: Capillary refill takes less than 2 seconds.   Neurological:      General: No focal deficit present.      Mental Status: She is alert and oriented to person, place, and time.      Motor: Weakness present.      Comments: Patient appears to have strength in lower extremities though seems somewhat guarded when testing against resistance on the  right making comparison somewhat difficult   Psychiatric:         Mood and Affect: Mood normal.         Behavior: Behavior normal.         Thought Content: Thought content normal.         Judgment: Judgment normal.     Emotional Behavior:   Normal   Debilities:  Back and right leg pain  Results Review:    I reviewed the patient's new clinical results.  Lab Results (most recent)       Procedure Component Value Units Date/Time    [place] Draw [[id number]] Collected: 03/27/24 0417    Specimen: Blood Updated: 03/27/24 0531    Narrative:      The following orders were created for panel order [place] Draw.  Procedure                               Abnormality         Status                     ---------                               -----------         ------                     Green Top (Gel)[[id number]]                                  Final result               Lavender Top[[id number]]                                     Final result               Gold Top - SST[[id number]]                                   Final result               Light Blue Top[[id number]]                                   Final result                 Please view results for these tests on the individual orders.    Lavender Top [[id number]] Collected: 03/27/24 0417    Specimen: Blood Updated: 03/27/24 0531     Extra Tube hold for add-on     Comment: Auto resulted       Gold Top - SST [[id number]] Collected: 03/27/24 0417    Specimen: Blood Updated: 03/27/24 0530     Extra Tube Hold for add-ons.     Comment: Auto resulted.       Light Blue Top [[id number]] Collected: 03/27/24 0417    Specimen: Blood Updated: 03/27/24 0530     Extra Tube Hold for add-ons.     Comment: Auto resulted       Green Top (Gel) [[id number]] Collected: 03/27/24 0417    Specimen: Blood Updated: 03/27/24 0523     Extra Tube d    Basic Metabolic Panel [[id number]]  (Abnormal) Collected: 03/27/24 0417    Specimen: Blood Updated: 03/27/24 0504     Glucose 104 mg/dL      BUN 12 mg/dL       Creatinine 0.74 mg/dL      Sodium 140 mmol/L      Potassium 4.2 mmol/L      Comment: Slight hemolysis detected by analyzer. Result may be falsely elevated.        Chloride 103 mmol/L      CO2 27.0 mmol/L      Calcium 9.1 mg/dL      BUN/Creatinine Ratio 16.2     Anion Gap 10.0 mmol/L      eGFR 89.9 mL/min/1.73     Narrative:      GFR Normal >60  Chronic Kidney Disease <60  Kidney Failure <15      CBC & Differential [[id number]]  (Abnormal) Collected: 03/27/24 0417    Specimen: Blood Updated: 03/27/24 0439    Narrative:      The following orders were created for panel order CBC & Differential.  Procedure                               Abnormality         Status                     ---------                               -----------         ------                     CBC Auto Differential[[id number]]        Abnormal            Final result                 Please view results for these tests on the individual orders.    CBC Auto Differential [[id number]]  (Abnormal) Collected: 03/27/24 0417    Specimen: Blood Updated: 03/27/24 0439     WBC 6.38 10*3/mm3      RBC 4.15 10*6/mm3      Hemoglobin 13.0 g/dL      Hematocrit 41.4 %      MCV 99.8 fL      MCH 31.3 pg      MCHC 31.4 g/dL      RDW 12.1 %      RDW-SD 44.7 fl      MPV 10.0 fL      Platelets 282 10*3/mm3      Neutrophil % 55.0 %      Lymphocyte % 32.3 %      Monocyte % 6.4 %      Eosinophil % 5.2 %      Basophil % 0.8 %      Immature Grans % 0.3 %      Neutrophils, Absolute 3.51 10*3/mm3      Lymphocytes, Absolute 2.06 10*3/mm3      Monocytes, Absolute 0.41 10*3/mm3      Eosinophils, Absolute 0.33 10*3/mm3      Basophils, Absolute 0.05 10*3/mm3      Immature Grans, Absolute 0.02 10*3/mm3      nRBC 0.0 /100 WBC             Imaging Results (Most Recent)       Procedure Component Value Units Date/Time    CT Lumbar Spine Without Contrast [[id number]] Collected: 03/27/24 0534     Updated: 03/27/24 0540    Narrative:      CT LUMBAR SPINE WO CONTRAST    Date of Exam: 3/27/2024  5:00 AM EDT    Indication: pain numbness.    Comparison: None available.    Technique: Axial CT images were obtained of the lumbar spine without contrast administration.  Sagittal and coronal reconstructions were performed.  Automated exposure control and iterative reconstruction methods were used.      Findings:  5 lumbar-type vertebrae are identified. There is 2 mm retrolisthesis of L5 on S1 and L1 on L2. There is vacuum disc phenomenon at every visualized level from T11 down to S1. There is moderate narrowing of the L5-S1 disc and mild narrowing of the other   thoracolumbar discs. The vertebral bodies maintain normal height. There is mild multilevel marginal osteophyte formation with moderate osteophytes at L5-S1. There is moderate mid and lower lumbar facet arthritis bilaterally. No evidence of fracture or   destructive osseous lesion. There are hypoplastic ribs at L1. Spinous and transverse processes and pedicles appear intact. Mild DJD at the sacroiliac joints. Mild atrophy of the lower lumbar paraspinal musculature. Mild aortic atherosclerosis. Colonic   diverticulosis.    At L1-L2, there is mild concentric disc bulge and mild facet hypertrophy without neural foraminal or spinal canal narrowing. At L2-L3, there is mild concentric disc bulge and moderate facet hypertrophy without neuroforaminal or spinal canal narrowing. At   L3-L4, there is concentric disc bulge and marginal osteophyte formation with moderate facet hypertrophy. There is mild narrowing of the spinal canal and mild left and moderate right neuroforaminal narrowing. At L4-L5, there is concentric disc bulge and   moderate facet hypertrophy. There is mild to moderate bilateral neuroforaminal narrowing and moderate to severe narrowing of the spinal canal. At L5-S1, there is concentric disc bulge and moderate facet hypertrophy resulting in moderate bilateral   neuroforaminal narrowing and mild narrowing of the spinal canal.      Impression:       Impression:  1.No acute osseous abnormality.  2.Moderate lumbar spondylosis with varying degrees of neural foraminal and spinal canal narrowing, most pronounced at L4-L5.  3.Mild DJD at the sacroiliac joints.  4.Colonic diverticulosis.        Electronically Signed: Brian Ha MD    3/27/2024 5:38 AM EDT    Workstation ID: [id number]    XR Hip With or Without Pelvis 2 - 3 View Right [[id number]] Collected: 03/27/24 0506     Updated: 03/27/24 0509    Narrative:      XR HIP W OR WO PELVIS 2-3 VIEW RIGHT    Date of Exam: 3/27/2024 4:45 AM EDT    Indication: pain    Comparison: CT abdomen pelvis 1/17/2024    Findings:  Ilioischial, iliopectineal and sacral arcuate lines appear intact.  The pubic symphysis and sacroiliac joints appear within normal limits.  The obturator rings are intact. There are small acetabular osteophytes bilaterally. There is mild pelvic and   trochanteric enthesopathy. Hip joints appear unremarkable.  There is no acute fracture or dislocation.  No erosions. There is mild degenerative change in the lower lumbar spine      Impression:      Impression:  Mild degenerative changes without acute osseous abnormality.        Electronically Signed: Brian Ha MD    3/27/2024 5:07 AM EDT    Workstation ID: [id number]          reviewed    ECG/EMG Results (most recent)       None          not reviewed    Results for orders placed during the hospital encounter of 03/27/24    Duplex Venous Lower Extremity - Right    Interpretation Summary    Normal right lower extremity venous duplex scan.          Microbiology Results (last 10 days)       ** No results found for the last 240 hours. **            Assessment & Plan     Intractable back pain       Back pain  -CBC and CMP unremarkable  -X-ray of hip and pelvis shows mild degenerative changes without acute osseous abnormality  -CT of lumbar spine showed no acute osseous abnormalities with moderate lumbar spondylolysis with varying degrees of neuroforaminal and  spinal canal narrowing most pronounced at L4-L5 with mild degenerative joint disease at the sacroiliac joint and colonic diverticulosis  -Venous duplex ultrasound of right lower extremity normal  -In the ED patient was given 0.5 mg Dilaudid x 2 as well as Zofran  -Neurosurgery consulted in ED  -Toradol, Flexeril and Lidoderm ordered  -May benefit from PT consult and possible pain management referral based on recommendations from neurosurgery and clinical course    Morbid obesity (BMI: 46.47)  -Encourage diet lifestyle modifications    I discussed the patients findings and my recommendations with patient and nursing staff.     Discharge Diagnosis:      Intractable back pain      Hospital Course  Patient is a 63 y.o. female presented with back and right hip pain with an HPI noted above.  CMP and CBC were assessed and found to be generally unremarkable with x-ray of hip and pelvis showing mild degenerative changes but no acute osseous abnormalities.  CT of lumbar spine was ordered in the ED which showed no acute osseous abnormalities with moderate lumbar spondylosis with varying degrees of neuroforaminal and spinal canal narrowing most prominent at L4-L5 as well as mild degenerative joint disease in the SI joint.  Venous duplex ultrasound of right lower extremity was also ordered in the ED and found to be unremarkable.  She was given 0.5 mg Dilaudid x 2 as well as Zofran.  Toradol, Flexeril and Lidoderm were ordered following her admission.  Neurosurgery was consulted who evaluated patient noting no urgent/emergent need for intervention at this time and recommending PT with outpatient follow-up.  PT was consulted who evaluated patient recommending outpatient physical therapy and walker which will be prescribed at discharge.  At this time patient is felt to be in good condition for discharge with close follow-up with her PCP as well as neurosurgery on an outpatient basis.  Her full testing/results and plan were  discussed with patient along with concerning/alarm symptoms for which to call 911/return to the ED. a course of Norco and prednisone will be prescribed at discharge.  All questions were answered and she verbalizes her understanding and agreement.    Past Medical History:     Past Medical History:   Diagnosis Date    Cancer     Ovarian Tumors removed       Past Surgical History:     Past Surgical History:   Procedure Laterality Date    ADRENAL GLAND SURGERY Right 2002    EYE SURGERY Right     THYROID SURGERY  1999       Social History:   Social History     Socioeconomic History    Marital status:    Tobacco Use    Smoking status: Never     Passive exposure: Never    Smokeless tobacco: Never   Vaping Use    Vaping status: Never Used   Substance and Sexual Activity    Alcohol use: No    Drug use: No    Sexual activity: Defer       Procedures Performed         Consults:   Consults       Date and Time Order Name Status Description    3/27/2024  7:26 AM Inpatient Neurosurgery Consult              Condition on Discharge:     Stable    Discharge Disposition      Discharge Medications     Discharge Medications        ASK your doctor about these medications        Instructions Start Date   cholecalciferol 25 MCG (1000 UT) tablet   25 mcg, Oral, Daily      diclofenac 50 MG EC tablet  Commonly known as: VOLTAREN   50 mg, Oral, 2 Times Daily PRN      ibuprofen 400 MG tablet  Commonly known as: ADVIL,MOTRIN   400 mg, Oral, Daily PRN               Discharge Diet:     Activity at Discharge:     Follow-up Appointments  Future Appointments   Date Time Provider Department Center   3/28/2024  9:00 AM [facility]  [facility]   3/28/2024  9:30 AM [facility]   4/20/2024 10:15 AM [facility] 3  [facility]   7/19/2024  8:45 AM Sophy Canchola APRN [facility]         Test Results Pending at Discharge       Risk for Readmission (LACE) Score: 2 (3/27/2024  6:00 AM)      Greater than 30 minutes spent in discharge  activities for this patient    Signature:Electronically signed by Jairo Viera PA-C, 03/27/24, 3:38 PM EDT.

## 2024-03-27 NOTE — DISCHARGE PLACEMENT REQUEST
"Latasha Ruiz (63 y.o. Female)       Date of Birth   1960    Social Security Number       Address   [address] [place] [address]    Home Phone   [phone]    MRN   [id number]       Rastafari   None    Marital Status                               Admission Date   3/27/24    Admission Type   Emergency    Admitting Provider   Fan Gomez DO    Attending Provider   Fan Gomez DO    Department, Room/Bed   [facility] OBSERVATION, 114/1       Discharge Date       Discharge Disposition       Discharge Destination                                 Attending Provider: Fan Gomez DO    Allergies: No Known Allergies    Isolation: None   Infection: None   Code Status: CPR    Ht: 160 cm (63\")   Wt: 119 kg (262 lb 5.6 oz)    Admission Cmt: None   Principal Problem: Intractable back pain [M54.9]                   Active Insurance as of 3/27/2024       Primary Coverage       Payor Plan Insurance Group Employer/Plan Group    HUMANA MEDICARE REPLACEMENT HUMANA MEDICARE REPLACEMENT [id number]       Payor Plan Address Payor Plan Phone Number Payor Plan Fax Number Effective Dates    [address] [address] [phone]  12/1/2018 - None Entered    [facility] [id number]         Subscriber Name Subscriber Birth Date Member ID       LATASHA RUIZ 1960 [id number]                     Emergency Contacts        (Rel.) Home Phone Work Phone Mobile Phone    IJEOMA RUIZ (Spouse) -- -- [phone]                "

## 2024-03-27 NOTE — CONSULTS
[facility]   Consult Note    Patient Name: Latasha Ruiz  : 1960  MRN: [id number]  Primary Care Physician:  Sophy Canchola APRN  Referring Physician: No Known Provider  Date of admission: 3/27/2024    Subjective   Subjective     Reason for Consult/ Chief Complaint: Back pain    HPI:  Latasha Ruiz is a 63 y.o. female with BMI 46 who presented to [facility] emergency department with complaints of right-sided low back pain that began about a week ago.  Pain radiates around to the right hip and worse on walking and some associated paresthesias down her leg.  There is no inciting event reported.  Patient reported no bowel or bladder dysfunction or saddle anesthesia.  ED workup included CT of the lumbar spine demonstrating chronic degenerative changes with presumably some moderate to severe narrowing of spinal canal at L4-L5.  She also had some mild DJD and sacroiliac joints.  Neurosurgery was consulted for further evaluation and offer any recommendations.  Patient reports that she began to experience right-sided back pain with radiation into her buttock and hip region around .  She reports no inciting event.  Her pain has since radiated further down her leg into the posterior aspect of her thigh and groin.  She describes no positional worsening.  She reports no chronic back issues.  She reports no numbness and tingling, bowel/bladder dysfunction or saddle anesthesia.    Review of Systems   All systems were reviewed and negative except for: Those reported in HPI    Personal History     Past Medical History:   Diagnosis Date    Cancer     Ovarian Tumors removed       Past Surgical History:   Procedure Laterality Date    ADRENAL GLAND SURGERY Right 2002    EYE SURGERY Right     THYROID SURGERY         Family History: family history includes Breast cancer in her mother. Otherwise pertinent FHx was reviewed and not pertinent to current issue.    Social History:  reports that she has never  smoked. She has never been exposed to tobacco smoke. She has never used smokeless tobacco. She reports that she does not drink alcohol and does not use drugs.    Home Medications:  cholecalciferol, diclofenac, and ibuprofen    Allergies:  No Known Allergies    Objective    Objective     Vitals:   Temp:  [97.4 °F (36.3 °C)-97.9 °F (36.6 °C)] 97.5 °F (36.4 °C)  Heart Rate:  [] 73  Resp:  [16-18] 16  BP: (115-158)/(71-89) 128/80    Physical Exam:   Alert and oriented by 3  Sensation is intact to soft touch  lower extremities  Motor strength is 5/5 in both  lower extremities with no focal motor deficits  Reflexes are 2+ in  lower extremities with no upper motor neuron signs    Right leg raise test    Result Review    Result Review:  I have personally reviewed the results from the time of this admission to 3/27/2024 14:18 EDT and agree with these findings:  []  Laboratory list / accordion  []  Microbiology  [x]  Radiology  []  EKG/Telemetry   []  Cardiology/Vascular   []  Pathology  []  Old records  []  Other:  Most notable findings include: CT lumbar spine demonstrating multilevel spondylitic changes seem to be worse at L4-L5 with moderate to severe canal stenosis presumed on nondedicated imaging.  DJD bilateral SI joints      Assessment & Plan   Assessment / Plan     Brief Patient Summary:  Latasha Ruiz is a 63 y.o. female with 1 week history of acute back and right leg pain.   No motor weakness on exam.  No red flag symptoms reported.  No neurogenic claudication reported her symptoms not matching up to specific dermatomal distribution and may actually represent more of sacroiliitis.  Nothing acute on imaging but she does have some moderate canal narrowing along the L4-L5 secondary to chronic degenerative changes.  Given the acuity of patient's pain, medical management with pharmacologic's and physical therapy are initially recommended.  Patient recommended to undergo aggressive weight loss.  She does have a BMI  of 46 currently precludes her from any elective neurosurgical intervention.    I explained to patient that the normal course of acute radicular pain is resolution within 8 to 12 weeks and that a conservative approach to management is usually recommended.  These conservative approaches include maximizing pharmacologic therapy and a formal course of physical therapy  to help reduce inflammation.    Lifestyle modifications such as decreased activity and avoidance of stress on the spine from bearing weight or twisting will also give the injured area an opportunity to heal and prevent further injury.   If in 6-8 weeks, patient has had no significant betterment of their symptoms despite these recommended conservative measures, patient welcome to follow-up in outpatient office for additional recommendations that may include advanced imaging.  Patient verbalizes understanding of plan of care and agrees to recommendations.       active Hospital Problems:  Active Hospital Problems    Diagnosis     **Intractable back pain    Lumbar spondylosis  Acute lumbar radiculopathy  Class III obesity    Plan:     -No urgent/emergent neurosurgical intervention warranted  - Medical management recommended with outpatient PT and pharmacologic measures  -Recommend steroid taper at discharge  - Aggressive weight loss  - Follow-up in outpatient clinic in 8 to 12 weeks if pain continues or worsens.    Assessment findings were discussed with Dr. Munroe agrees with plan of care.    Martha Winchester DNP, APRN  Neurosurgery

## 2024-03-27 NOTE — CASE MANAGEMENT/SOCIAL WORK
Discharge Planning Assessment   [facility]     Patient Name: Latasha Ruiz  MRN: [id number]  Today's Date: 3/27/2024    Admit Date: 3/27/2024    Plan: Routine home   Discharge Needs Assessment       Row Name 03/27/24 1135       Living Environment    People in Home spouse    Name(s) of People in Home  John    Current Living Arrangements home    Potentially Unsafe Housing Conditions none    In the past 12 months has the electric, gas, oil, or water company threatened to shut off services in your home? No    Primary Care Provided by self    Provides Primary Care For no one    Family Caregiver if Needed spouse    Family Caregiver Names John    Quality of Family Relationships helpful;involved;supportive    Able to Return to Prior Arrangements yes       Resource/Environmental Concerns    Resource/Environmental Concerns none    Transportation Concerns none       Transportation Needs    In the past 12 months, has lack of transportation kept you from medical appointments or from getting medications? no    In the past 12 months, has lack of transportation kept you from meetings, work, or from getting things needed for daily living? No       Food Insecurity    Within the past 12 months, you worried that your food would run out before you got the money to buy more. Never true    Within the past 12 months, the food you bought just didn't last and you didn't have money to get more. Never true       Transition Planning    Patient/Family Anticipates Transition to home with family    Patient/Family Anticipated Services at Transition none    Transportation Anticipated car, drives self;family or friend will provide       Discharge Needs Assessment    Readmission Within the Last 30 Days no previous admission in last 30 days    Equipment Currently Used at Home none    Concerns to be Addressed denies needs/concerns at this time    Anticipated Changes Related to Illness none    Equipment Needed After Discharge none                    Discharge Plan       Row Name 03/27/24 1135       Plan    Plan Routine home    Plan Comments CM met with patient at the bedside. Confirmed PCP, insurance, and pharmacy. Patient denies any difficulty affording medications. Patient is not current with any HHC/OPPT/OT services. Patient lives at home with spouse, is IADLS, and drives.  can provide DC transport. DC Barriers: Neuro surgery consult, venous duplex ordered.                  Continued Care and Services - Admitted Since 3/27/2024    No active coordination exists for this encounter.       Expected Discharge Date and Time       Expected Discharge Date Expected Discharge Time    Mar 28, 2024            Demographic Summary       Row Name 03/27/24 1134       General Information    Admission Type observation    Arrived From emergency department    Required Notices Provided Observation Status Notice    Referral Source admission list    Reason for Consult discharge planning    Preferred Language English       Contact Information    Permission Granted to Share Info With     Contact Information Obtained for                    Functional Status       Row Name 03/27/24 1135       Functional Status    Usual Activity Tolerance good    Current Activity Tolerance moderate       Functional Status, IADL    Medications independent    Meal Preparation independent    Housekeeping independent    Laundry independent    Shopping independent       Mental Status    General Appearance WDL WDL       Mental Status Summary    Recent Changes in Mental Status/Cognitive Functioning no changes           Toy Coulter RN     Cell number [phone]  Office number [phone]

## 2024-03-27 NOTE — PLAN OF CARE
Goal Outcome Evaluation:  Plan of Care Reviewed With: patient           Outcome Evaluation: Pt is a 63 y.o. male who presented with Back pain. X-ray of hip and pelvis shows mild degenerative changes without acute osseous abnormality.  CT of lumbar spine showed moderate lumbar spondylolysis with varying degrees of neuroforaminal and spinal canal narrowing most pronounced at L4-L5 with mild degenerative joint disease at the sacroiliac joint and colonic diverticulosis. Venous duplex ultrasound of right lower extremity was normal.   Neurosurgery was consulted and is not planning surgery at this time. Pt is independent at baseline without AD. She presents for PT eval with impaired RLE strength, back pain and RLE radicular pain, impaired transfers and gait and impaired posture. Without an AD pt requires modA for bed to BSC transfer. Trialed RW and pt demos improved tolerance and independence - able to ambulate and complete sit to/from from EOB with CGA. She has a supportive spouse who is able and willing to assist her as needed. She needs a RW for home. PT recommends DC home with outpatient PT.      Anticipated Discharge Disposition (PT): home with outpatient therapy services

## 2024-03-27 NOTE — ED PROVIDER NOTES
Subjective   History of Present Illness  Chief complaint: Patient is a 63-year-old who on the  of the month began having pain in her right lower back.  It radiates around to her right hip.  She cannot walk anymore without severe pain and being hunched over.  She is feeling some numbness going down her leg.  She did not injure herself.  This started abruptly and has progressively worsened.  With the worsening symptoms she presented here.  There is been no loss of bowel or bladder.  She has had no fever.  No weakness.    Context:    Duration:    Timin week    Severity: Severe    Associated Symptoms:        PCP:  LMP:      Review of Systems   Constitutional:  Negative for fever.   Respiratory: Negative.     Cardiovascular: Negative.    Gastrointestinal:  Negative for abdominal pain.   Musculoskeletal:  Positive for arthralgias and back pain.   Neurological:  Positive for numbness.       Past Medical History:   Diagnosis Date    Cancer     Ovarian Tumors removed       No Known Allergies    Past Surgical History:   Procedure Laterality Date    ADRENAL GLAND SURGERY Right 2002    EYE SURGERY Right     THYROID SURGERY  1999       Family History   Problem Relation Age of Onset    Breast cancer Mother        Social History     Socioeconomic History    Marital status:    Tobacco Use    Smoking status: Never     Passive exposure: Never    Smokeless tobacco: Never   Vaping Use    Vaping status: Never Used   Substance and Sexual Activity    Alcohol use: No    Drug use: No    Sexual activity: Defer           Objective   Physical Exam  Vitals and nursing note reviewed.   Constitutional:       Appearance: She is obese.   HENT:      Head: Normocephalic and atraumatic.   Cardiovascular:      Rate and Rhythm: Normal rate and regular rhythm.      Heart sounds: Normal heart sounds.   Pulmonary:      Effort: Pulmonary effort is normal.      Breath sounds: Normal breath sounds.   Abdominal:      Tenderness: There is no  abdominal tenderness.   Musculoskeletal:      Lumbar back: Tenderness present. Positive right straight leg raise test.        Back:    Neurological:      General: No focal deficit present.      Mental Status: She is alert and oriented to person, place, and time.      Sensory: Sensory deficit present.      Motor: No weakness.   Psychiatric:         Mood and Affect: Mood normal.         Thought Content: Thought content normal.         Procedures           ED Course                                 Results for orders placed or performed during the hospital encounter of 03/27/24   Basic Metabolic Panel    Specimen: Blood   Result Value Ref Range    Glucose 104 (H) 65 - 99 mg/dL    BUN 12 8 - 23 mg/dL    Creatinine 0.74 0.57 - 1.00 mg/dL    Sodium 140 136 - 145 mmol/L    Potassium 4.2 3.5 - 5.2 mmol/L    Chloride 103 98 - 107 mmol/L    CO2 27.0 22.0 - 29.0 mmol/L    Calcium 9.1 8.6 - 10.5 mg/dL    BUN/Creatinine Ratio 16.2 7.0 - 25.0    Anion Gap 10.0 5.0 - 15.0 mmol/L    eGFR 89.9 >60.0 mL/min/1.73   CBC Auto Differential    Specimen: Blood   Result Value Ref Range    WBC 6.38 3.40 - 10.80 10*3/mm3    RBC 4.15 3.77 - 5.28 10*6/mm3    Hemoglobin 13.0 12.0 - 15.9 g/dL    Hematocrit 41.4 34.0 - 46.6 %    MCV 99.8 (H) 79.0 - 97.0 fL    MCH 31.3 26.6 - 33.0 pg    MCHC 31.4 (L) 31.5 - 35.7 g/dL    RDW 12.1 (L) 12.3 - 15.4 %    RDW-SD 44.7 37.0 - 54.0 fl    MPV 10.0 6.0 - 12.0 fL    Platelets 282 140 - 450 10*3/mm3    Neutrophil % 55.0 42.7 - 76.0 %    Lymphocyte % 32.3 19.6 - 45.3 %    Monocyte % 6.4 5.0 - 12.0 %    Eosinophil % 5.2 0.3 - 6.2 %    Basophil % 0.8 0.0 - 1.5 %    Immature Grans % 0.3 0.0 - 0.5 %    Neutrophils, Absolute 3.51 1.70 - 7.00 10*3/mm3    Lymphocytes, Absolute 2.06 0.70 - 3.10 10*3/mm3    Monocytes, Absolute 0.41 0.10 - 0.90 10*3/mm3    Eosinophils, Absolute 0.33 0.00 - 0.40 10*3/mm3    Basophils, Absolute 0.05 0.00 - 0.20 10*3/mm3    Immature Grans, Absolute 0.02 0.00 - 0.05 10*3/mm3    nRBC 0.0 0.0 -  0.2 /100 WBC   Green Top (Gel)   Result Value Ref Range    Extra Tube d    Lavender Top   Result Value Ref Range    Extra Tube hold for add-on    Gold Top - SST   Result Value Ref Range    Extra Tube Hold for add-ons.    Light Blue Top   Result Value Ref Range    Extra Tube Hold for add-ons.        CT Lumbar Spine Without Contrast    Result Date: 3/27/2024  Impression: 1.No acute osseous abnormality. 2.Moderate lumbar spondylosis with varying degrees of neural foraminal and spinal canal narrowing, most pronounced at L4-L5. 3.Mild DJD at the sacroiliac joints. 4.Colonic diverticulosis. Electronically Signed: Brian Ha MD  3/27/2024 5:38 AM EDT  Workstation ID: [id number]    XR Hip With or Without Pelvis 2 - 3 View Right    Result Date: 3/27/2024  Impression: Mild degenerative changes without acute osseous abnormality. Electronically Signed: Brian Ha MD  3/27/2024 5:07 AM EDT  Workstation ID: [id number]             Medical Decision Making  Patient was seen evaluated for the above problem    Differential diagnosis includes but is not limited to cauda equina, sciatica, iliotibial band, DVT    Patient shows no neurovascular compromise.  She has good distal pulses.  She has pain originating from her low back radiating around into her hip with numbness in her thigh.  I believe she does have sciatica.  Pain medication was given to the patient with no significant improvement.  She is having difficult with mobility and daily management of ADLs secondary to the severe pain.  She shows no signs of cauda equina symptoms.  CT scan and x-rays were reviewed by myself.  With persisting severe pain will place in the ED observation unit.  PT OT and neurosurgical consultation.  Patient verbalized understanding is okay with this.  Will also obtain DVT Doppler study for her right lower leg pain.    Amount and/or Complexity of Data Reviewed  Labs: ordered. Decision-making details documented in ED Course.     Details: Labs reviewed  by  Radiology: ordered and independent interpretation performed.     Details: CT and x-ray reviewed most    Risk  Prescription drug management.  Parenteral controlled substances.        Final diagnoses:   None   Intractable back pain    ED Disposition  ED Disposition       None            No follow-up provider specified.       Medication List      No changes were made to your prescriptions during this visit.            Fan Gomez DO  03/27/24 0572

## 2024-03-28 ENCOUNTER — TRANSITIONAL CARE MANAGEMENT TELEPHONE ENCOUNTER (OUTPATIENT)
Dept: CALL CENTER | Facility: HOSPITAL | Age: 64
End: 2024-03-28
Payer: MEDICARE

## 2024-03-28 NOTE — CASE MANAGEMENT/SOCIAL WORK
Case Management Discharge Note      Final Note: Routine home         Selected Continued Care - Discharged on 3/27/2024 Admission date: 3/27/2024 - Discharge disposition: Home or Self Care        Durable Medical Equipment Coordination complete.      Service Provider Selected Services Address Phone Fax Patient Preferred    [facility] [address], [place] [facility] [address] [phone] --              Therapy Coordination complete.      Service Provider Selected Services Address Phone Fax Patient Preferred    [facility] [address] [place] [id number] [phone] --               Transportation Services  Private: Car    Final Discharge Disposition Code: 01 - home or self-care

## 2024-03-28 NOTE — OUTREACH NOTE
Call Center TCM Note      Flowsheet Row Responses   [facility] [place] patient discharged from? [facility]   Does the patient have one of the following disease processes/diagnoses(primary or secondary)? Other   TCM attempt successful? Yes   Call start time 1337   Call end time 1343   Is patient permission given to speak with other caregiver? Yes   List who call center can speak with spouse   Medication alerts for this patient steroids   Meds reviewed with patient/caregiver? Yes   Is the patient having any side effects they believe may be caused by any medication additions or changes? No   Does the patient have all medications ordered at discharge? Yes   Is the patient taking all medications as directed (includes completed medication regime)? Yes   Comments Pcp hospital f/u scheduled for 4-2-2024 @ 10 am.   Does the patient have an appointment with their PCP within 7-14 days of discharge? No   Nursing Interventions Assisted patient with making appointment per protocol, Routed TCM call to PCP office   Has home health visited the patient within 72 hours of discharge? N/A   What DME was ordered? Pt is not using a walker.   Psychosocial issues? No   Did the patient receive a copy of their discharge instructions? Yes   Nursing interventions Reviewed instructions with patient   What is the patient's perception of their health status since discharge? Improving   Is the patient/caregiver able to teach back signs and symptoms related to disease process for when to call PCP? Yes   Is the patient/caregiver able to teach back signs and symptoms related to disease process for when to call 911? Yes   Is the patient/caregiver able to teach back the hierarchy of who to call/visit for symptoms/problems? PCP, Specialist, Home health nurse, Urgent Care, ED, 911 Yes   TCM call completed? Yes   Wrap up additional comments Pt reports improving. Pt does not require a walker. Pt has all medications.   Call end time 1343   Would this patient  benefit from a Referral to [facility] Social Work? No   Is the patient interested in additional calls from an ambulatory ? No            Janine Landrum RN    3/28/2024, 13:45 EDT

## 2024-03-29 RX ORDER — IBUPROFEN 600 MG/1
TABLET ORAL
COMMUNITY
Start: 2024-03-25

## 2024-04-02 ENCOUNTER — OFFICE VISIT (OUTPATIENT)
Dept: FAMILY MEDICINE CLINIC | Facility: CLINIC | Age: 64
End: 2024-04-02
Payer: MEDICARE

## 2024-04-02 VITALS
HEART RATE: 107 BPM | HEIGHT: 63 IN | WEIGHT: 261 LBS | BODY MASS INDEX: 46.25 KG/M2 | SYSTOLIC BLOOD PRESSURE: 142 MMHG | OXYGEN SATURATION: 95 % | TEMPERATURE: 96.8 F | DIASTOLIC BLOOD PRESSURE: 95 MMHG

## 2024-04-02 DIAGNOSIS — M54.9 INTRACTABLE BACK PAIN: ICD-10-CM

## 2024-04-02 RX ORDER — CYCLOBENZAPRINE HCL 10 MG
10 TABLET ORAL 3 TIMES DAILY PRN
Qty: 30 TABLET | Refills: 0 | Status: SHIPPED | OUTPATIENT
Start: 2024-04-02

## 2024-04-02 RX ORDER — HYDROCODONE BITARTRATE AND ACETAMINOPHEN 7.5; 325 MG/1; MG/1
1 TABLET ORAL EVERY 6 HOURS PRN
Qty: 12 TABLET | Refills: 0 | Status: SHIPPED | OUTPATIENT
Start: 2024-04-02

## 2024-04-02 NOTE — PROGRESS NOTES
Chief Complaint  Hospital Follow Up Visit (From 03/27/24)    Bianca Ruiz presents to [facility]  History of Present Illness  Latasha is a 63-year-old female presenting today for a hospital follow-up visit.  She was seen at [facility] on 3/27/2024 for right lower back pain.    Hospital Course  Patient is a 63 y.o. female presented with back and right hip pain with an HPI noted above.  CMP and CBC were assessed and found to be generally unremarkable with x-ray of hip and pelvis showing mild degenerative changes but no acute osseous abnormalities.  CT of lumbar spine was ordered in the ED which showed no acute osseous abnormalities with moderate lumbar spondylosis with varying degrees of neuroforaminal and spinal canal narrowing most prominent at L4-L5 as well as mild degenerative joint disease in the SI joint.  Venous duplex ultrasound of right lower extremity was also ordered in the ED and found to be unremarkable.  She was given 0.5 mg Dilaudid x 2 as well as Zofran.  Toradol, Flexeril and Lidoderm were ordered following her admission.  Neurosurgery was consulted who evaluated patient noting no urgent/emergent need for intervention at this time and recommending PT with outpatient follow-up.  PT was consulted who evaluated patient recommending outpatient physical therapy and walker which will be prescribed at discharge.  At this time patient is felt to be in good condition for discharge with close follow-up with her PCP as well as neurosurgery on an outpatient basis.  Her full testing/results and plan were discussed with patient along with concerning/alarm symptoms for which to call 911/return to the ED. a course of Norco and prednisone will be prescribed at discharge.  All questions were answered and she verbalizes her understanding and agreement.      Since Discharge:  Patient is still having pain right lower back and hip pain. It is worse when she lays  "flat. She has had to sit up to sleep. She has one more day of steroids. She has been taking the norco 1-2 per day. She says the Norco and Flexeril have been helping her pain. She is waiting for PT to call to schedule an appointment.       The following portions of the patient's history were reviewed and updated as appropriate: allergies, current medications, past family history, past medical history, past social history, past surgical history and problem list.    No Known Allergies    Patient Active Problem List   Diagnosis    Pheochromocytoma    Other specified hypothyroidism    Intractable back pain       Current Outpatient Medications   Medication Instructions    cholecalciferol 25 mcg, Oral, Daily    cyclobenzaprine (FLEXERIL) 10 mg, Oral, 3 Times Daily PRN    diclofenac (VOLTAREN) 50 mg, Oral, 2 Times Daily PRN    HYDROcodone-acetaminophen (NORCO) 7.5-325 MG per tablet 1 tablet, Oral, Every 6 Hours PRN    predniSONE (DELTASONE) 10 MG tablet Take 4 tablets by mouth Daily for 2 days, THEN 3 tablets Daily for 2 days, THEN 2 tablets Daily for 2 days, THEN 1 tablet Daily for 2 days.          Objective   Vital Signs:  /95 (BP Location: Left arm, Patient Position: Sitting, Cuff Size: Large Adult)   Pulse 107   Temp 96.8 °F (36 °C) (Temporal)   Ht 160 cm (63\")   Wt 118 kg (261 lb)   SpO2 95%   BMI 46.23 kg/m²   Estimated body mass index is 46.23 kg/m² as calculated from the following:    Height as of this encounter: 160 cm (63\").    Weight as of this encounter: 118 kg (261 lb).               Review of Systems   Constitutional:  Negative for activity change, chills, fatigue, fever and unexpected weight change.   Cardiovascular:  Negative for leg swelling.   Musculoskeletal:  Positive for back pain. Negative for arthralgias, gait problem, joint swelling, myalgias, neck pain and neck stiffness.   Neurological:  Negative for weakness.        Physical Exam  Constitutional:       Appearance: Normal appearance. "   Cardiovascular:      Rate and Rhythm: Normal rate and regular rhythm.   Pulmonary:      Effort: Pulmonary effort is normal.      Breath sounds: Normal breath sounds.   Musculoskeletal:      Lumbar back: Tenderness present. Decreased range of motion.      Right hip: Tenderness present. Decreased range of motion.   Neurological:      Mental Status: She is alert and oriented to person, place, and time.   Psychiatric:         Mood and Affect: Mood normal.         Behavior: Behavior normal.         Thought Content: Thought content normal.         Judgment: Judgment normal.        Result Review :                   Assessment and Plan   Diagnoses and all orders for this visit:    1. Intractable back pain  Comments:  Refill Norco and Flexeril  Waiting for PT apt  May consider pain mgmt if symptoms continue  Orders:  -     HYDROcodone-acetaminophen (NORCO) 7.5-325 MG per tablet; Take 1 tablet by mouth Every 6 (Six) Hours As Needed for Moderate Pain.  Dispense: 12 tablet; Refill: 0  -     cyclobenzaprine (FLEXERIL) 10 MG tablet; Take 1 tablet by mouth 3 (Three) Times a Day As Needed for Muscle Spasms.  Dispense: 30 tablet; Refill: 0             Follow Up   No follow-ups on file.  Patient was given instructions and counseling regarding her condition or for health maintenance advice. Please see specific information pulled into the AVS if appropriate.

## 2024-04-04 ENCOUNTER — HOSPITAL ENCOUNTER (OUTPATIENT)
Dept: MAMMOGRAPHY | Facility: HOSPITAL | Age: 64
Discharge: HOME | End: 2024-04-04
Payer: MEDICARE

## 2024-04-04 ENCOUNTER — HOSPITAL ENCOUNTER (OUTPATIENT)
Dept: BONE DENSITY | Facility: HOSPITAL | Age: 64
Discharge: HOME | End: 2024-04-04
Payer: MEDICARE

## 2024-04-04 DIAGNOSIS — Z12.31 SCREENING MAMMOGRAM FOR BREAST CANCER: ICD-10-CM

## 2024-04-04 DIAGNOSIS — Z13.820 SCREENING FOR OSTEOPOROSIS: ICD-10-CM

## 2024-04-04 PROCEDURE — 77067 SCR MAMMO BI INCL CAD: CPT

## 2024-04-04 PROCEDURE — 77080 DXA BONE DENSITY AXIAL: CPT

## 2024-04-04 PROCEDURE — 77063 BREAST TOMOSYNTHESIS BI: CPT

## 2024-04-10 ENCOUNTER — TELEPHONE (OUTPATIENT)
Dept: FAMILY MEDICINE CLINIC | Facility: CLINIC | Age: 64
End: 2024-04-10
Payer: MEDICARE

## 2024-04-10 DIAGNOSIS — M54.2 NECK PAIN: Primary | ICD-10-CM

## 2024-04-10 NOTE — TELEPHONE ENCOUNTER
Damaris AKERS called while she was working on [employer] it was getting denied cause :   .   Insurance is requiring the patient have an MRI completed first       Please advise

## 2024-04-15 DIAGNOSIS — M54.9 INTRACTABLE BACK PAIN: ICD-10-CM

## 2024-04-15 RX ORDER — HYDROCODONE BITARTRATE AND ACETAMINOPHEN 7.5; 325 MG/1; MG/1
1 TABLET ORAL EVERY 6 HOURS PRN
Qty: 12 TABLET | Refills: 0 | OUTPATIENT
Start: 2024-04-15

## 2024-04-15 NOTE — TELEPHONE ENCOUNTER
Caller: Latasha Ruiz    Relationship: Self    Best call back number: [phone]     Requested Prescriptions:   Requested Prescriptions     Pending Prescriptions Disp Refills    HYDROcodone-acetaminophen (NORCO) 7.5-325 MG per tablet 12 tablet 0     Sig: Take 1 tablet by mouth Every 6 (Six) Hours As Needed for Moderate Pain.        Pharm[id number] [place] - [address] AT [address] [phone] [facility] [phone]      Last office visit with prescribing clinician: 4/2/2024   Last telemedicine visit with prescribing clinician: Visit date not found   Next office visit with prescribing clinician: 7/19/2024     Additional details provided by patient: PATIENT ONLY HAS ONE DAY SUPPLY REMAINING    Does the patient have less than a 3 day supply:  [x] Yes  [] No    Would you like a call back once the refill request has been completed: [x] Yes [] No    If the office needs to give you a call back, can they leave a voicemail: [x] Yes [] No    Genesis Hernadez Rep   04/15/24 11:50 EDT

## 2024-04-17 NOTE — PROGRESS NOTES
Subjective   History of Present Illness: Latasha Ruiz is a 63 y.o. female is being seen for consultation today at the request of Fan Gomez DO for acute back pain.  Patient received tetanus shot for routine immunizations about a month ago.  Couple days after receiving the immunization, patient began experiencing mainly right-sided low back pain into her buttock and hip as well as bilateral anterior leg pain and bilateral dorsal foot numbness and tingling.  She also reports component of bilateral groin pain right over left.  Her pain is described as worse going from sitting to standing position as well as standing and walking.  She has significant trouble traversing steps and getting in and out of a car.  Prior to the injection she does not describe any back pain or leg pain but does have chronic left knee issues and has been told that she probably needs a left knee replacement.  She was seen and evaluated in the ED with no emergent findings and given Toradol injection that seem to help her somewhat.  She was also given 1 dose of oral steroids that she does not feel helped at all.  She describes no bowel/bladder dysfunction or saddle anesthesia.  She does have MRI of the lumbar spine pending for Saturday and due to start physical therapy at the end of this month.    History of Present Illness    The following portions of the patient's history were reviewed and updated as appropriate: allergies, current medications, past family history, past medical history, past social history, past surgical history, and problem list.    Review of Systems   Constitutional:  Positive for activity change.   HENT: Negative.     Eyes: Negative.    Respiratory: Negative.     Cardiovascular: Negative.    Gastrointestinal: Negative.    Endocrine: Negative.    Genitourinary: Negative.    Musculoskeletal:  Positive for arthralgias, back pain (bilateral legs/buttocks) and myalgias.   Skin: Negative.    Neurological:  Positive for  "weakness (bilateral legs) and numbness (bilateral feet).   Hematological: Negative.    Psychiatric/Behavioral:  Positive for sleep disturbance.    All other systems reviewed and are negative.      Objective     ./83   Pulse 84   Ht 160 cm (63\")   Wt 118 kg (261 lb 3.2 oz)   BMI 46.27 kg/m²    Body mass index is 46.27 kg/m².    Vitals:    04/18/24 0852   PainSc: 10-Worst pain ever          Physical Exam  Neurologic Exam  Poor effort on testing but appears that she is full strength bilaterally  Positive Martin finger  Positive right Jp's maneuver  Positive right SI compression          Assessment & Plan   Independent Review of Radiographic Studies:      I personally reviewed the images from the following studies.    CT lumbar spine 3/27/2024    Minimal spondylolisthesis at L1-L2 and L5-S1.  Moderate to advanced spondylitic changes from T11-S1.  Moderate to severe narrowing of the canal at L4-L5.  Mild DJD of the SI joints.    Medical Decision Making:      Latasha Beck a 63 y.o. female with BMI 46 presenting to clinic today as a new patient for acute onset of what is felt to be lumbar radiculopathy and/or component of sacroiliitis secondary to possible systemic immune response from the tetanus shot.  She does have underlying lumbar and SI joint pathology as noted above on CT spine imaging and report completely asymptomatic until she received the injection.  It is possible that her inflammatory response from the injection has caused these underlying pathologies to become symptomatic.  Patient exhibited poor effort on maneuvers, but she did have good muscle strength bilaterally.  She had no reports of any ascending weakness or other red flag signs/ symptoms.  I will place her on a 10-day course of dexamethasone.  She was given a Toradol injection in the office today as she was extremely painful.  She should continue with plans for physical therapy at the end of the month.  I have asked her to have her PCP " send me her MRI imaging for review for any other acute pathologies that may be responsible for her symptoms.  I encouraged her to call the office with any questions or concerns and we will follow-up in 4 weeks to see how she is progressing.  Patient agrees to plan of care and wishes to proceed.  I did tell patient if she begins to experience worsening pain, leg weakness, or other acute neurologic symptoms that she should report back to the ER for further recommendations.    Details of the Procedure      After reviewing the risks and benefits, the patient was deemed in satisfactory condition to undergo the procedure.  She was placed in prone position on table and injection area was cleaned with alcohol.  She was administered an IM injection of 30 mg ketorolac into her right dorsogluteal region.  Patient tolerated it well.               Diagnoses and all orders for this visit:    1. Sacroiliitis (Primary)  -     ketorolac (TORADOL) injection 30 mg    2. Acute right-sided low back pain without sciatica  -     ketorolac (TORADOL) injection 30 mg    Other orders  -     dexAMETHasone (DECADRON) 1.5 MG tablet; Take 1 tablet by mouth Take As Directed. .dl  Dispense: 35 tablet; Refill: 0      Return in about 4 weeks (around 5/16/2024) for Recheck.    This patient was examined wearing appropriate personal protective equipment.     [place]  reports that she has never smoked. She has never been exposed to tobacco smoke. She has never used smokeless tobacco.      Body mass index is 46.27 kg/m².     Class 3 Severe Obesity (BMI >=40). Obesity-related health conditions include the following:...... Obesity is unchanged. BMI is is above average; BMI management plan is completed.  Recommendations for portion control and increasing exercise.     Patient's blood pressure was reviewed.  Recommendations for  a low-salt diet and exercise to maintain/improve BP in addition to taking any presribed medications.    Advance Care Planning    ACP discussion was held with the patient during this visit. Patient has an advance directive (not in EMR), copy requested.         EVANGELIST Calderon  04/18/24  10:02 EDT

## 2024-04-18 ENCOUNTER — OFFICE VISIT (OUTPATIENT)
Dept: NEUROSURGERY | Facility: CLINIC | Age: 64
End: 2024-04-18
Payer: MEDICARE

## 2024-04-18 VITALS
SYSTOLIC BLOOD PRESSURE: 138 MMHG | HEART RATE: 84 BPM | HEIGHT: 63 IN | BODY MASS INDEX: 46.28 KG/M2 | WEIGHT: 261.2 LBS | DIASTOLIC BLOOD PRESSURE: 83 MMHG

## 2024-04-18 DIAGNOSIS — M46.1 SACROILIITIS: Primary | ICD-10-CM

## 2024-04-18 DIAGNOSIS — M54.50 ACUTE RIGHT-SIDED LOW BACK PAIN WITHOUT SCIATICA: ICD-10-CM

## 2024-04-18 RX ORDER — DEXAMETHASONE 1.5 MG/1
1.5 TABLET ORAL TAKE AS DIRECTED
Qty: 35 TABLET | Refills: 0 | Status: SHIPPED | OUTPATIENT
Start: 2024-04-18

## 2024-04-18 RX ORDER — KETOROLAC TROMETHAMINE 30 MG/ML
30 INJECTION, SOLUTION INTRAMUSCULAR; INTRAVENOUS EVERY 6 HOURS PRN
Status: SHIPPED | OUTPATIENT
Start: 2024-04-18 | End: 2024-04-23

## 2024-04-18 RX ADMIN — KETOROLAC TROMETHAMINE 30 MG: 30 INJECTION, SOLUTION INTRAMUSCULAR; INTRAVENOUS at 10:03

## 2024-04-19 ENCOUNTER — TELEPHONE (OUTPATIENT)
Dept: NEUROSURGERY | Facility: CLINIC | Age: 64
End: 2024-04-19
Payer: MEDICARE

## 2024-04-19 ENCOUNTER — PATIENT ROUNDING (BHMG ONLY) (OUTPATIENT)
Dept: NEUROSURGERY | Facility: CLINIC | Age: 64
End: 2024-04-19
Payer: MEDICARE

## 2024-04-19 NOTE — TELEPHONE ENCOUNTER
Patient's  called and stated that the Pharmacy would not fill the Dexamethasone due to needing clarification on how to take the medication. I did tell him that I will put this message in and someone will call the pharmacy to clarify this. He understood.

## 2024-04-19 NOTE — TELEPHONE ENCOUNTER
Called and let patient know that I spoke with the pharmacy and I gave it time to get filled and let them know it should be by now.

## 2024-04-20 ENCOUNTER — HOSPITAL ENCOUNTER (OUTPATIENT)
Dept: MRI IMAGING | Facility: HOSPITAL | Age: 64
Discharge: HOME | End: 2024-04-20
Payer: MEDICARE

## 2024-04-20 DIAGNOSIS — M54.2 NECK PAIN: ICD-10-CM

## 2024-04-20 PROCEDURE — 72141 MRI NECK SPINE W/O DYE: CPT

## 2024-04-22 ENCOUNTER — TELEPHONE (OUTPATIENT)
Dept: FAMILY MEDICINE CLINIC | Facility: CLINIC | Age: 64
End: 2024-04-22
Payer: MEDICARE

## 2024-04-22 NOTE — TELEPHONE ENCOUNTER
Caller: Latasha Ruiz    Relationship: Self    Best call back number: [phone]     What test was performed: MRI     When was the test performed: [facility]     Where was the test performed: [facility]     Additional notes: PATIENT NEEDS RESULTS FROM MRI FAXED OR SENT TO NEUROLOGIST DR. ANTHONY AGUIAR. [phone]

## 2024-04-23 DIAGNOSIS — M54.9 INTRACTABLE BACK PAIN: ICD-10-CM

## 2024-04-23 RX ORDER — CYCLOBENZAPRINE HCL 10 MG
10 TABLET ORAL 3 TIMES DAILY PRN
Qty: 30 TABLET | Refills: 0 | Status: SHIPPED | OUTPATIENT
Start: 2024-04-23

## 2024-04-23 NOTE — TELEPHONE ENCOUNTER
Caller: Latasha Ruiz    Relationship: Self    Best call back number: [phone]     Requested Prescriptions:   Requested Prescriptions     Pending Prescriptions Disp Refills    cyclobenzaprine (FLEXERIL) 10 MG tablet 30 tablet 0     Sig: Take 1 tablet by mouth 3 (Three) Times a Day As Needed for Muscle Spasms.        Pharmacy where request should be sent: [id number] [place] - [address] AT [address] [facility] [address] [facility] [phone] [facility] [phone]      Last office visit with prescribing clinician: 4/2/2024   Last telemedicine visit with prescribing clinician: Visit date not found   Next office visit with prescribing clinician: 7/19/2024     Additional details provided by patient: PATIENT IS COMPLETELY OUT OF THIS MEDICATION    Does the patient have less than a 3 day supply:  [x] Yes  [] No    Would you like a call back once the refill request has been completed: [] Yes [] No    If the office needs to give you a call back, can they leave a voicemail: [] Yes [] No    Genesis Hernadez Rep   04/23/24 10:17 EDT

## 2024-05-15 ENCOUNTER — TELEPHONE (OUTPATIENT)
Dept: NEUROSURGERY | Facility: CLINIC | Age: 64
End: 2024-05-15
Payer: MEDICARE

## 2024-05-15 NOTE — TELEPHONE ENCOUNTER
"LVM FOR PATIENT TO CALL THE OFFICE AS WE HAVE SOME QUESTIONS FOR HER REGARDING HER APPOINTMENT ON THURSDAY 5/16/24.    \"DID PATIENT COMPLETE A LUMBAR MRI,IF SO WHERE AND WHEN?\"  "

## 2024-05-17 NOTE — TELEPHONE ENCOUNTER
CALLED PATIENT AND ASKED IF SHE HAD COMPLETED AN MRI ON HER LUMBAR SPINE AND SHE STATED THAT SHE HAD NOT AND WAS DOING MUCH BETTER. I TOLD HER WE WOULD SEE HER ON TUESDAY FOR HER APPOINTMENT.

## 2024-05-24 ENCOUNTER — OFFICE VISIT (OUTPATIENT)
Dept: NEUROSURGERY | Facility: CLINIC | Age: 64
End: 2024-05-24
Payer: MEDICARE

## 2024-05-24 VITALS
BODY MASS INDEX: 45.71 KG/M2 | OXYGEN SATURATION: 99 % | WEIGHT: 258 LBS | HEART RATE: 100 BPM | RESPIRATION RATE: 18 BRPM | HEIGHT: 63 IN | DIASTOLIC BLOOD PRESSURE: 109 MMHG | SYSTOLIC BLOOD PRESSURE: 163 MMHG

## 2024-05-24 DIAGNOSIS — M50.30 DDD (DEGENERATIVE DISC DISEASE), CERVICAL: ICD-10-CM

## 2024-05-24 DIAGNOSIS — M46.1 SACROILIITIS: Primary | ICD-10-CM

## 2024-05-24 RX ORDER — IBUPROFEN 600 MG/1
TABLET ORAL
COMMUNITY
End: 2024-05-24

## 2024-05-24 RX ORDER — KETOROLAC TROMETHAMINE 30 MG/ML
30 INJECTION, SOLUTION INTRAMUSCULAR; INTRAVENOUS ONCE
Status: COMPLETED | OUTPATIENT
Start: 2024-05-24 | End: 2024-05-24

## 2024-05-24 RX ADMIN — KETOROLAC TROMETHAMINE 30 MG: 30 INJECTION, SOLUTION INTRAMUSCULAR; INTRAVENOUS at 09:54

## 2024-06-27 ENCOUNTER — OFFICE (AMBULATORY)
Dept: URBAN - METROPOLITAN AREA PATHOLOGY 19 | Facility: PATHOLOGY | Age: 64
End: 2024-06-27
Payer: COMMERCIAL

## 2024-06-27 ENCOUNTER — OFFICE (AMBULATORY)
Dept: URBAN - METROPOLITAN AREA PATHOLOGY 19 | Facility: PATHOLOGY | Age: 64
End: 2024-06-27
Payer: MEDICARE

## 2024-06-27 ENCOUNTER — ON CAMPUS - OUTPATIENT (AMBULATORY)
Dept: URBAN - METROPOLITAN AREA HOSPITAL 2 | Facility: HOSPITAL | Age: 64
End: 2024-06-27
Payer: MEDICARE

## 2024-06-27 VITALS
OXYGEN SATURATION: 98 % | HEIGHT: 64 IN | DIASTOLIC BLOOD PRESSURE: 89 MMHG | DIASTOLIC BLOOD PRESSURE: 65 MMHG | DIASTOLIC BLOOD PRESSURE: 99 MMHG | HEART RATE: 105 BPM | DIASTOLIC BLOOD PRESSURE: 109 MMHG | SYSTOLIC BLOOD PRESSURE: 143 MMHG | DIASTOLIC BLOOD PRESSURE: 82 MMHG | HEART RATE: 97 BPM | OXYGEN SATURATION: 99 % | WEIGHT: 258 LBS | SYSTOLIC BLOOD PRESSURE: 144 MMHG | DIASTOLIC BLOOD PRESSURE: 77 MMHG | SYSTOLIC BLOOD PRESSURE: 130 MMHG | HEART RATE: 101 BPM | DIASTOLIC BLOOD PRESSURE: 68 MMHG | SYSTOLIC BLOOD PRESSURE: 103 MMHG | DIASTOLIC BLOOD PRESSURE: 100 MMHG | HEART RATE: 98 BPM | TEMPERATURE: 97.6 F | HEART RATE: 104 BPM | RESPIRATION RATE: 18 BRPM | SYSTOLIC BLOOD PRESSURE: 108 MMHG | SYSTOLIC BLOOD PRESSURE: 135 MMHG | SYSTOLIC BLOOD PRESSURE: 147 MMHG | RESPIRATION RATE: 16 BRPM | SYSTOLIC BLOOD PRESSURE: 150 MMHG | HEART RATE: 100 BPM

## 2024-06-27 DIAGNOSIS — K62.1 RECTAL POLYP: ICD-10-CM

## 2024-06-27 DIAGNOSIS — K63.5 POLYP OF COLON: ICD-10-CM

## 2024-06-27 DIAGNOSIS — Z12.11 ENCOUNTER FOR SCREENING FOR MALIGNANT NEOPLASM OF COLON: ICD-10-CM

## 2024-06-27 DIAGNOSIS — K57.30 DIVERTICULOSIS OF LARGE INTESTINE WITHOUT PERFORATION OR ABS: ICD-10-CM

## 2024-06-27 LAB
GI HISTOLOGY: A. DESCENDING COLON: (no result)
GI HISTOLOGY: B. SIGMOID COLON: (no result)
GI HISTOLOGY: C. RECTUM: (no result)
GI HISTOLOGY: PDF REPORT: (no result)

## 2024-06-27 PROCEDURE — 88305 TISSUE EXAM BY PATHOLOGIST: CPT | Mod: 26,Q6 | Performed by: PATHOLOGY

## 2024-06-27 PROCEDURE — 45385 COLONOSCOPY W/LESION REMOVAL: CPT | Mod: PT | Performed by: INTERNAL MEDICINE

## 2024-06-27 PROCEDURE — 88305 TISSUE EXAM BY PATHOLOGIST: CPT | Mod: Q6,26 | Performed by: PATHOLOGY

## 2024-07-30 ENCOUNTER — OFFICE VISIT (OUTPATIENT)
Dept: FAMILY MEDICINE CLINIC | Facility: CLINIC | Age: 64
End: 2024-07-30
Payer: MEDICARE

## 2024-07-30 ENCOUNTER — LAB (OUTPATIENT)
Dept: FAMILY MEDICINE CLINIC | Facility: CLINIC | Age: 64
End: 2024-07-30
Payer: MEDICARE

## 2024-07-30 VITALS
TEMPERATURE: 97.5 F | WEIGHT: 258 LBS | HEART RATE: 88 BPM | DIASTOLIC BLOOD PRESSURE: 82 MMHG | HEIGHT: 63 IN | SYSTOLIC BLOOD PRESSURE: 135 MMHG | BODY MASS INDEX: 45.71 KG/M2 | OXYGEN SATURATION: 97 %

## 2024-07-30 DIAGNOSIS — I10 PRIMARY HYPERTENSION: ICD-10-CM

## 2024-07-30 DIAGNOSIS — E03.9 HYPOTHYROIDISM, UNSPECIFIED TYPE: ICD-10-CM

## 2024-07-30 DIAGNOSIS — M62.838 MUSCLE SPASM: ICD-10-CM

## 2024-07-30 DIAGNOSIS — R53.83 OTHER FATIGUE: Primary | ICD-10-CM

## 2024-07-30 PROCEDURE — 1125F AMNT PAIN NOTED PAIN PRSNT: CPT | Performed by: NURSE PRACTITIONER

## 2024-07-30 PROCEDURE — 99214 OFFICE O/P EST MOD 30 MIN: CPT | Performed by: NURSE PRACTITIONER

## 2024-07-30 RX ORDER — OMEGA-3 FATTY ACIDS/FISH OIL 300-1000MG
CAPSULE ORAL
COMMUNITY

## 2024-07-30 NOTE — PROGRESS NOTES
"Chief Complaint  Follow-up and Spasms    Subjective        Latasha Ruiz presents to [facility]  History of Present Illness  Latasha is a 63-year-old female presenting today for 4-month follow-up on her chronic health issues.    Hypertension:  Was previously on amlodipine, but stopped it.  Controlling BP through diet and exercise.    Hypothyroidism:  S/p right hemithyroidectomy.  Was on levothyroxine, but levels have been stable without it.   She reports fatigue all the time, but she does not sleep well at night.   Denies constipation or hair loss    Left leg spasm:  When she wakes up in the AM her left leg feels tight.  Only lasts a few minutes  Occurs every morning.  No problems throughout the day.      The following portions of the patient's history were reviewed and updated as appropriate: allergies, current medications, past family history, past medical history, past social history, past surgical history and problem list.    No Known Allergies    Patient Active Problem List   Diagnosis    Pheochromocytoma    Other specified hypothyroidism    Intractable back pain    Acute right-sided low back pain without sciatica    Sacroiliitis    DDD (degenerative disc disease), cervical       Current Outpatient Medications   Medication Instructions    cholecalciferol (VITAMIN D3) 25 mcg, Oral, Daily    dexAMETHasone (DECADRON) 1.5 mg, Oral, Take As Directed, .dl    diclofenac (VOLTAREN) 50 mg, Oral, 2 Times Daily PRN    Ibuprofen (Advil) 200 MG capsule           Objective   Vital Signs:  /82 (BP Location: Left arm, Patient Position: Sitting, Cuff Size: Large Adult)   Pulse 88   Temp 97.5 °F (36.4 °C) (Temporal)   Ht 160 cm (63\")   Wt 117 kg (258 lb)   SpO2 97%   BMI 45.70 kg/m²   Estimated body mass index is 45.7 kg/m² as calculated from the following:    Height as of this encounter: 160 cm (63\").    Weight as of this encounter: 117 kg (258 lb).               Review of Systems "     Physical Exam   Result Review :                   Assessment and Plan   Diagnoses and all orders for this visit:    1. Other fatigue (Primary)  Comments:  will check thryoid levels  Orders:  -     T4, Free  -     TSH    2. Primary hypertension  Comments:  stable.  cont controlling through diet and exercise.  f/u in March for wellness exam    3. Hypothyroidism, unspecified type  Comments:  labs ordered due to patient's c/o fatigue.    4. Muscle spasm  Comments:  stable.  encouraged stretching.  may consider baclofen if symptoms worsen             Follow Up   Return in about 7 months (around 2/28/2025) for Medicare Wellness.  Patient was given instructions and counseling regarding her condition or for health maintenance advice. Please see specific information pulled into the AVS if appropriate.

## 2024-09-12 ENCOUNTER — TELEPHONE (OUTPATIENT)
Dept: NEUROSURGERY | Facility: CLINIC | Age: 64
End: 2024-09-12
Payer: MEDICARE

## 2024-09-12 RX ORDER — DEXAMETHASONE 1.5 MG/1
1.5 TABLET ORAL TAKE AS DIRECTED
Qty: 35 TABLET | Refills: 0 | Status: SHIPPED | OUTPATIENT
Start: 2024-09-12

## 2024-09-12 NOTE — TELEPHONE ENCOUNTER
Per verbal with Martha wylie to refill her Decadron given she has not had one since April. Called the patient and verified her pharmacy with her. RX sent in and patient is aware.

## 2024-09-12 NOTE — TELEPHONE ENCOUNTER
Caller: JAMES    Relationship: SELF    Best call back number: [phone]   Requested Prescriptions:   Requested Prescriptions      No prescriptions requested or ordered in this encounter        dexAMETHasone (DECADRON) 1.5 MG tablet   Pharmacy where request should be sent:  [id number] - [place], [place] - [address] - [phone]  - [phone] FX  [address] [place] [address]  Phone: [phone]  Fax: [phone]     Last office visit with prescribing clinician: 5/24/2024   Last telemedicine visit with prescribing clinician: Visit date not found   Next office visit with prescribing clinician: Visit date not found     Additional details provided by patient: PATIENT FEELS PAIN MAKING HER HAVE TROUBLE WALKING    Does the patient have less than a 3 day supply:  [] Yes  [x] No      If the office needs to give you a call back, can they leave a voicemail: [x] Yes [] No    Soledad Correia MA   09/12/24 15:35 EDT

## 2025-03-18 ENCOUNTER — OFFICE VISIT (OUTPATIENT)
Dept: FAMILY MEDICINE CLINIC | Facility: CLINIC | Age: 65
End: 2025-03-18
Payer: MEDICARE

## 2025-03-18 ENCOUNTER — LAB (OUTPATIENT)
Dept: FAMILY MEDICINE CLINIC | Facility: CLINIC | Age: 65
End: 2025-03-18
Payer: MEDICARE

## 2025-03-18 VITALS
BODY MASS INDEX: 45.61 KG/M2 | SYSTOLIC BLOOD PRESSURE: 163 MMHG | WEIGHT: 257.4 LBS | HEIGHT: 63 IN | TEMPERATURE: 97.8 F | OXYGEN SATURATION: 98 % | HEART RATE: 83 BPM | DIASTOLIC BLOOD PRESSURE: 98 MMHG

## 2025-03-18 DIAGNOSIS — N32.81 OVERACTIVE BLADDER: ICD-10-CM

## 2025-03-18 DIAGNOSIS — Z12.31 ENCOUNTER FOR SCREENING MAMMOGRAM FOR MALIGNANT NEOPLASM OF BREAST: ICD-10-CM

## 2025-03-18 DIAGNOSIS — R03.0 ELEVATED BLOOD PRESSURE READING: ICD-10-CM

## 2025-03-18 DIAGNOSIS — M54.2 CERVICAL PAIN (NECK): ICD-10-CM

## 2025-03-18 DIAGNOSIS — Z00.00 ENCOUNTER FOR MEDICARE ANNUAL WELLNESS EXAM: Primary | ICD-10-CM

## 2025-03-18 LAB
BILIRUB BLD-MCNC: NEGATIVE MG/DL
CLARITY, POC: CLEAR
COLOR UR: NORMAL
EXPIRATION DATE: NORMAL
GLUCOSE UR STRIP-MCNC: NEGATIVE MG/DL
KETONES UR QL: NEGATIVE
LEUKOCYTE EST, POC: NEGATIVE
Lab: NORMAL
NITRITE UR-MCNC: NEGATIVE MG/ML
PH UR: 6 [PH] (ref 5–8)
PROT UR STRIP-MCNC: NEGATIVE MG/DL
RBC # UR STRIP: NEGATIVE /UL
SP GR UR: 1.01 (ref 1–1.03)
UROBILINOGEN UR QL: NORMAL

## 2025-03-18 RX ORDER — OXYBUTYNIN CHLORIDE 10 MG/1
10 TABLET, EXTENDED RELEASE ORAL DAILY
Qty: 90 TABLET | Refills: 3 | Status: SHIPPED | OUTPATIENT
Start: 2025-03-18

## 2025-03-18 RX ORDER — IBUPROFEN 800 MG/1
800 TABLET, FILM COATED ORAL EVERY 8 HOURS PRN
Qty: 60 TABLET | Refills: 3 | Status: SHIPPED | OUTPATIENT
Start: 2025-03-18

## 2025-03-18 NOTE — PROGRESS NOTES
Subjective   The ABCs of the Annual Wellness Visit  Medicare Wellness Visit      Latasha Ruiz is a 64 y.o. patient who presents for a Medicare Wellness Visit.    The following portions of the patient's history were reviewed and   updated as appropriate: allergies, current medications, past family history, past medical history, past social history, past surgical history, and problem list.    Compared to one year ago, the patient's physical   health is the same.  Compared to one year ago, the patient's mental   health is the same.    Recent Hospitalizations:  This patient has had a [facility] admission record on file within the last 365 days.  Current Medical Providers:  Patient Care Team:  Sophy Canchola APRN as PCP - General (Nurse Practitioner)    Outpatient Medications Prior to Visit   Medication Sig Dispense Refill    Cholecalciferol 25 MCG (1000 UT) tablet Take 1 tablet by mouth Daily.      diclofenac (VOLTAREN) 50 MG EC tablet Take 1 tablet by mouth 2 (Two) Times a Day As Needed (pain). 60 tablet 3    dexAMETHasone (DECADRON) 1.5 MG tablet Take 1 tablet by mouth Take As Directed. .dl 35 tablet 0    Ibuprofen (Advil) 200 MG capsule        No facility-administered medications prior to visit.     No opioid medication identified on active medication list. I have reviewed chart for other potential  high risk medication/s and harmful drug interactions in the elderly.      Aspirin is not on active medication list.  Aspirin use is not indicated based on review of current medical condition/s. Risk of harm outweighs potential benefits.  .    Patient Active Problem List   Diagnosis    Pheochromocytoma    Other specified hypothyroidism    Intractable back pain    Acute right-sided low back pain without sciatica    Sacroiliitis    DDD (degenerative disc disease), cervical     Advance Care Planning Advance Directive is not on file.  ACP discussion was held with the patient during this visit. Patient does not have an  "advance directive, information provided.            Objective   Vitals:    03/18/25 1038 03/18/25 1119   BP: (!) 149/101 163/98   BP Location: Left arm    Patient Position: Sitting    Cuff Size: Large Adult    Pulse: 83    Temp: 97.8 °F (36.6 °C)    TempSrc: Temporal    SpO2: 98%    Weight: 117 kg (257 lb 6.4 oz)    Height: 160 cm (62.99\")    PainSc: 6     PainLoc: Knee        Estimated body mass index is 45.61 kg/m² as calculated from the following:    Height as of this encounter: 160 cm (62.99\").    Weight as of this encounter: 117 kg (257 lb 6.4 oz).                Does the patient have evidence of cognitive impairment? No                                                                                                Health  Risk Assessment    Smoking Status:  Social History     Tobacco Use   Smoking Status Never    Passive exposure: Never   Smokeless Tobacco Never     Alcohol Consumption:  Social History     Substance and Sexual Activity   Alcohol Use No       Fall Risk Screen  STEADI Fall Risk Assessment was completed, and patient is at LOW risk for falls.Assessment completed on:3/18/2025    Depression Screening   Little interest or pleasure in doing things? Not at all   Feeling down, depressed, or hopeless? Not at all   PHQ-2 Total Score 0      Health Habits and Functional and Cognitive Screening:      3/18/2025    10:43 AM   Functional & Cognitive Status   Do you have difficulty preparing food and eating? No   Do you have difficulty bathing yourself, getting dressed or grooming yourself? No   Do you have difficulty using the toilet? No   Do you have difficulty moving around from place to place? No   Do you have trouble with steps or getting out of a bed or a chair? No   Current Diet Well Balanced Diet   Dental Exam Up to date   Eye Exam Up to date   Exercise (times per week) 0 times per week   Current Exercises Include No Regular Exercise   Do you need help using the phone?  No   Are you deaf or do you have " serious difficulty hearing?  No   Do you need help to go to places out of walking distance? No   Do you need help shopping? No   Do you need help preparing meals?  No   Do you need help with housework?  No   Do you need help with laundry? No   Do you need help taking your medications? No   Do you need help managing money? No   Do you ever drive or ride in a car without wearing a seat belt? No   Have you felt unusual stress, anger or loneliness in the last month? No   Who do you live with? Spouse   If you need help, do you have trouble finding someone available to you? No   Have you been bothered in the last four weeks by sexual problems? No   Do you have difficulty concentrating, remembering or making decisions? No           Age-appropriate Screening Schedule:  Refer to the list below for future screening recommendations based on patient's age, sex and/or medical conditions. Orders for these recommended tests are listed in the plan section. The patient has been provided with a written plan.    Health Maintenance List  Health Maintenance   Topic Date Due    Pneumococcal Vaccine 50+ (1 of 1 - PCV) 03/18/2025 (Originally 8/16/2008)    ZOSTER VACCINE (1 of 2) 03/18/2025 (Originally 8/16/2008)    INFLUENZA VACCINE  03/31/2025 (Originally 7/1/2024)    COVID-19 Vaccine (1 - 2024-25 season) 05/19/2025 (Originally 9/1/2024)    BMI FOLLOWUP  03/27/2025    ANNUAL WELLNESS VISIT  03/18/2026    MAMMOGRAM  04/04/2026    DXA SCAN  04/04/2026    TDAP/TD VACCINES (2 - Td or Tdap) 03/18/2034    COLORECTAL CANCER SCREENING  06/27/2034    HEPATITIS C SCREENING  Completed                                                                                                                                                CMS Preventative Services Quick Reference  Risk Factors Identified During Encounter  Immunizations Discussed/Encouraged: Prevnar 20 (Pneumococcal 20-valent conjugate)    The above risks/problems have been discussed with the  "patient.  Pertinent information has been shared with the patient in the After Visit Summary.  An After Visit Summary and PPPS were made available to the patient.    Follow Up:   Next Medicare Wellness visit to be scheduled in 1 year.         Additional E&M Note during same encounter follows:  Patient has additional, significant, and separately identifiable condition(s)/problem(s) that require work above and beyond the Medicare Wellness Visit     Chief Complaint  Medicare Wellness-Initial Visit, bladder issues (Still feels full after going to bathrm ), Numbness (Right arm tingles, all the time for about 6 months now per pt ), and Hypertension    Subjective   HPI  Latasha is also being seen today for an annual adult preventative physical exam.  Her blood pressure is elevated today.  She does check her blood pressure at home occasionally and it runs fairly low (90s-100s/60s).  Denies any chest pain or headaches.  She has limited her salt intake.  She has a history of overactive bladder.  She previously saw a urologist who had her on Ditropan.  When she moved to the area she was no longer seeing urology and stopped taking the Ditropan.  She reports urinating more frequently at nighttime and the feeling of not emptying her bladder completely.  She also reports right neck pain and right arm numbness and tingling.  That started about 6 months ago.  Her mammogram is due.  Her last colonoscopy was last year; she repeats in 10 years.  She declined flu, COVID, shingles, and pneumonia vaccine.                  Objective   Vital Signs:  /98   Pulse 83   Temp 97.8 °F (36.6 °C) (Temporal)   Ht 160 cm (62.99\")   Wt 117 kg (257 lb 6.4 oz)   SpO2 98%   BMI 45.61 kg/m²   Physical Exam               Assessment and Plan      Encounter for Medicare annual wellness exam    Orders:    Comprehensive Metabolic Panel    Hemoglobin A1c    Lipid Panel    T4, Free    TSH    Vitamin D,25-Hydroxy    CBC & Differential    Encounter for " screening mammogram for malignant neoplasm of breast    Orders:    Mammo Screening Digital Tomosynthesis Bilateral With CAD; Future    Cervical pain (neck)    Orders:    Ambulatory Referral to Physical Therapy for Evaluation & Treatment    Overactive bladder    Orders:    POC Urinalysis Dipstick, Automated    oxybutynin XL (Ditropan XL) 10 MG 24 hr tablet; Take 1 tablet by mouth Daily.    Elevated blood pressure reading                 Follow Up   Return in about 1 year (around 3/18/2026) for Medicare Wellness.  Patient was given instructions and counseling regarding her condition or for health maintenance advice. Please see specific information pulled into the AVS if appropriate.

## 2025-04-07 ENCOUNTER — HOSPITAL ENCOUNTER (OUTPATIENT)
Dept: MAMMOGRAPHY | Facility: HOSPITAL | Age: 65
Discharge: HOME | End: 2025-04-07
Admitting: NURSE PRACTITIONER
Payer: MEDICARE

## 2025-04-07 DIAGNOSIS — Z12.31 ENCOUNTER FOR SCREENING MAMMOGRAM FOR MALIGNANT NEOPLASM OF BREAST: ICD-10-CM

## 2025-04-07 PROCEDURE — 77067 SCR MAMMO BI INCL CAD: CPT

## 2025-04-07 PROCEDURE — 77063 BREAST TOMOSYNTHESIS BI: CPT

## 2025-04-08 ENCOUNTER — TREATMENT (OUTPATIENT)
Dept: PHYSICAL THERAPY | Facility: CLINIC | Age: 65
End: 2025-04-08
Payer: MEDICARE

## 2025-04-08 DIAGNOSIS — M54.2 CERVICAL PAIN: Primary | ICD-10-CM

## 2025-04-08 PROCEDURE — 97161 PT EVAL LOW COMPLEX 20 MIN: CPT | Performed by: PHYSICAL THERAPIST

## 2025-04-08 PROCEDURE — 97535 SELF CARE MNGMENT TRAINING: CPT | Performed by: PHYSICAL THERAPIST

## 2025-04-08 PROCEDURE — 97110 THERAPEUTIC EXERCISES: CPT | Performed by: PHYSICAL THERAPIST

## 2025-04-08 NOTE — PROGRESS NOTES
Physical Therapy Initial Evaluation and Plan of Care        [address], [address] [place] [address]     Patient: Latasha Ruiz   : 1960  Diagnosis/ICD-10 Code:  Cervical pain [M54.2]  Referring practitioner: Jairo Viera PA-C  Date of Initial Visit: 2025  Today's Date: 2025  Patient seen for 1 sessions           Subjective Questionnaire: NDI:40% limited      Subjective Evaluation    History of Present Illness  Mechanism of injury: Pt presents to OP PT with neck pain and headaches for the past 3 months.  She denies any mechanism of injury.        Patient Occupation: n/a Pain  Current pain ratin  At best pain ratin  At worst pain ratin  Location: neck  Quality: burning, needle-like, radiating, pressure and discomfort  Aggravating factors: sleeping, lifting, overhead activity, movement and prolonged positioning  Progression: no change    Social Support  Lives with: spouse    Hand dominance: right    Diagnostic Tests  MRI studies: abnormal    Treatments  Current treatment: physical therapy  Patient Goals  Patient goals for therapy: decreased edema, decreased pain, increased motion and increased strength  Patient goal: walking without neck pain       24 Cervical MRI:    Multilevel degenerative changes. Moderate canal stenosis at C3-C4. Moderate right foraminal narrowing at C4-C5. Severe right foraminal narrowing at C5-C6. Moderate right and severe left foraminal narrowing at C6-C7. Severe right and moderate   left foraminal narrowing at C7-T1.     Precautions: ovarian tumors removed, arthritis, headaches    Objective        Special Questions  Patient is experiencing disturbed sleep and headaches.       Postural Observations  Seated posture: fair  Standing posture: fair  Correction of posture: makes symptoms worse    Additional Postural Observation Details  B rounded shoulders and forward head position    Palpation     Right   Hypertonic in the cervical paraspinals, levator  scapulae, middle trapezius, pectoralis minor, scalenes, suboccipitals and upper trapezius. Tenderness of the cervical paraspinals, levator scapulae, middle trapezius, pectoralis minor, scalenes, suboccipitals and upper trapezius.   Trigger point to scalenes and upper trapezius.     Additional Palpation Details  No ttp in the L side of the same mm    Tenderness   Cervical Spine   Tenderness in the right 1st rib.     Right Shoulder  Tenderness in the clavicle.     Neurological Testing     Sensation   Cervical/Thoracic   Left   Intact: light touch    Right   Hypersensation: light touch    Comments   Right light touch: C 3, C5, C6, T1.     Additional Neurological Details  N/t in the R UE and all fingers except thumb intermittently    Active Range of Motion   Cervical/Thoracic Spine   Cervical  Subcranial protraction: WFL   Subcranial retraction: WFL and with pain   Flexion: 25 degrees with pain  Extension: 25 degrees with pain  Left lateral flexion: 25 degrees with pain  Right lateral flexion: 30 degrees with pain  Left rotation: 55 degrees with pain  Right rotation: 45 degrees     Additional Active Range of Motion Details  Apley's X body and OH R WFL and pain-free    Strength/Myotome Testing   Cervical Spine   Neck extension: 4+  Neck flexion: 4+ (painful)    Left   Neck lateral flexion (C3): 4- (painful)    Right   Neck lateral flexion (C3): 4-    Right Shoulder     Planes of Motion   Flexion: 4- (painful)   Abduction: 4- (painful)     Right Elbow   Flexion: 4+  Extension: 4+    Tests   Cervical     Right   Positive cervical distraction.     Additional Tests Details  Relief with manual distraction      See Exercise, Manual, and Modality Logs for complete treatment.     Assessment & Plan       Assessment  Impairments: abnormal muscle tone, abnormal or restricted ROM, activity intolerance, impaired physical strength, lacks appropriate home exercise program and pain with function   Functional limitations: carrying  objects, lifting, sleeping, uncomfortable because of pain, reaching overhead and unable to perform repetitive tasks   Assessment details: The patient is a 64 y.o. female who presents to physical therapy today for neck pain. Upon initial evaluation, the patient demonstrates the following impairments: headaches, radicular symptoms in R UE, R shoulder strength deficits, R neck mm ttp and tightness and trigger points with referral pattern from R UT, limited cervical ROM, postural impairments. Due to these impairments, the patient is unable to lift, sleep without pain. The patient would benefit from skilled PT services to address functional limitations and impairments and to improve patient quality of life.        Goals  Plan Goals: LTG 1: 12 weeks:  The patient will demonstrate 60° of B cervical spine rotation, 45° of B cervical side bending, 45° of cervical flexion, and 45° of cervical extension in order to adequately monitor blind spots while driving and improve ability to perform activities of daily living.    STATUS:  New  STG 1a: 6 weeks:  The patient will demonstrate 50° of B cervical spine rotation, 40° of B cervical side bending, 40° of cervical flexion, and 40° of cervical extension in order to adequately monitor blind spots while driving and improve ability to perform activities of daily living.       STATUS:  New   LTG 2: 12 weeks:  The patient will report 0/10 pain in order to more easily tolerate activities of daily living and improve sleep quality.    STATUS:  New  STG 2a: 6 weeks:  The patient will report 1/10 pain.    STATUS:  New  LTG 3: 12 weeks:  The patient will report a decrease in radicular symptoms in the R upper extremity by 75%.    STATUS:  New  STG 3a: 4 weeks:  The patient will report a decrease in radicular symptoms in the L upper extremity by 50%.    STATUS:  New     Plan  Therapy options: will be seen for skilled therapy services  Planned modality interventions: dry needling, cryotherapy,  TENS, electrical stimulation/Russian stimulation, traction, ultrasound and thermotherapy (hydrocollator packs)  Planned therapy interventions: manual therapy, neuromuscular re-education, postural training, soft tissue mobilization, spinal/joint mobilization, strengthening, stretching, therapeutic activities, joint mobilization, home exercise program, flexibility, functional ROM exercises and body mechanics training  Frequency: 1x week  Duration in weeks: 12  Treatment plan discussed with: patient        Visit Diagnoses:    ICD-10-CM ICD-9-CM   1. Cervical pain  M54.2 723.1       History # of Personal Factors and/or Comorbidities: MODERATE (1-2)  Examination of Body System(s): # of elements: LOW (1-2)  Clinical Presentation: STABLE   Clinical Decision Making: LOW       Timed:         Manual Therapy:         mins  94670;     Therapeutic Exercise:    10     mins  83892;     Neuromuscular Ernie:        mins  63012;    Therapeutic Activity:          mins  37863;     Gait Training:           mins  87871;     Ultrasound:          mins  19019;    Ionto                                   mins   06656  Self Care                       13     mins   25010  Canalith Repos         mins 18681      Un-Timed:  Electrical Stimulation:         mins  57078 ( );  Dry Needling          mins self-pay  Traction          mins 17737  Low Eval     30     Mins  34882  Mod Eval          Mins  19849  High Eval                            Mins  39957  Re-Eval                               mins  17084    Timed Treatment:   23   mins   Total Treatment:     53   mins    PT SIGNATURE: Jessica Arboleda PT         Initial Certification  Certification Period: 4/8/2025 thru 7/7/2025  I certify that the therapy services are furnished while this patient is under my care.  The services outlined above are required by this patient, and will be reviewed every 90 days.     PHYSICIAN: Jairo Viera PA-C      DATE:     Please sign and return via fax to  [phone].. Thank you, [facility].

## 2025-04-28 ENCOUNTER — DOCUMENTATION (OUTPATIENT)
Dept: PHYSICAL THERAPY | Facility: CLINIC | Age: 65
End: 2025-04-28
Payer: MEDICARE

## 2025-04-28 DIAGNOSIS — M54.2 CERVICAL PAIN: Primary | ICD-10-CM

## 2025-04-28 NOTE — PROGRESS NOTES
Discharge Summary  Discharge Summary from Physical Therapy Report      Number of Visits: eval only     Discharge Status of Patient: See MD Note dated 4/8/25    Goals: Not Met    Discharge Plan:  unexpectedly d/c due to non-compliance, so no assessment performed    Comments pt no showed 2 times and will be d/c per  policy    Date of Discharge 4/28/25        Jessica Arboleda, SHELLIE  Physical Therapist

## 2025-05-01 ENCOUNTER — TELEPHONE (OUTPATIENT)
Dept: NEUROSURGERY | Facility: CLINIC | Age: 65
End: 2025-05-01
Payer: MEDICARE

## 2025-05-01 NOTE — TELEPHONE ENCOUNTER
Patient called in and was asking for a steriod for her hip and knees. Explained to the patient I will send a message and give a call back.

## 2025-07-30 NOTE — PROGRESS NOTES
"Subjective   History of Present Illness: Latasha Ruiz is a 64 y.o. female is here today for follow-up. Patient was previously seen by myself on 5/24/2024 for acute onset of lumbar radiculopathy with a component of sacroiliitis along with neck and arm symptoms.She was given Toradol injection and recommended to follow-up as needed.  Today patient returns with chronic worsening pain in her lower back radiating into her hips and posterior laterally down her legs left over right.  Symptoms are mainly noted when she is walking or standing and better if she sits.  But if she sits for any length of time her back pain does act up.  She will get some intermittent left foot tingling as well.  Symptoms been going on for about 5 6 months.  She is taking no medications for this.  She describes no acute bowel or bladder dysfunction or saddle anesthesia.      History of Present Illness    The following portions of the patient's history were reviewed and updated as appropriate: allergies, current medications, past family history, past medical history, past social history, past surgical history, and problem list.    Review of Systems   Constitutional:  Positive for activity change.   Eyes: Negative.    Respiratory: Negative.     Cardiovascular: Negative.    Gastrointestinal:  Positive for abdominal pain (front bilateral).   Endocrine: Negative.    Genitourinary:  Positive for urgency (bladder).   Musculoskeletal:  Positive for arthralgias, back pain (sharp-lower/and front-hips) and myalgias.   Skin: Negative.    Allergic/Immunologic: Negative.    Neurological:  Positive for numbness (+tingling in hands and arms).   Hematological: Negative.    Psychiatric/Behavioral: Negative.         Objective     BP (!) 170/110   Pulse 90   Resp 18   Ht 160 cm (62.99\")   Wt 119 kg (263 lb)   SpO2 98%   BMI 46.60 kg/m²    Body mass index is 46.6 kg/m².    Vitals:    07/31/25 0901   PainSc: 7    PainLoc: Back          Physical Exam  Lower " extremity motor strength 5/5  1+ patellar reflexes        Assessment & Plan   Independent Review of Radiographic Studies:      I personally reviewed the images from the following studies.    CT lumbar spine 2024    Multilevel degenerative changes with moderate amount of stenosis at L3-L4 but fairly significant canal stenosis felt to be at L4-L5.    Medical Decision Making:      Latasha Beck a 64 y.o. female being followed up today for symptoms more consistent with neurogenic claudication.  She was seen in the past for sacroiliitis but today's clinical presentation not consistent with any sacroiliac joint pathology.  She does have fairly significant canal narrowing at L4-L5 and old CT imaging.  I have ordered updated imaging and we will follow-up afterward for further definitive treatment plan..  I encouraged aggressive weight loss as well as physical therapy.  I will also place her on a steroid Dosepak to help with her inflammation in the interim.  Patient is agreeable to plan of care and wishes to proceed.  Advanced imaging (i.e. MRI, CT scan, or CT myelogram) was ordered today during your office visit. Once this order is approved by insurance, our office will call you ASAP in regards to appointment details for your test. If there is a waiting time on this, it is because we are waiting on approval from your insurance.     Please schedule a follow-up appointment to discuss your test results in the office.    For the fastest turn around time for your advanced imaging to be reviewed by a provider and uploaded into our system, pick a [facility] facility.     If you choose non-[facility] facility, you will be responsible for bringing the images on a CD to your follow-up appointment. If you forget the CD at the time of your appointment, you may be asked to reschedule.       Diagnoses and all orders for this visit:    1. Neurogenic claudication (Primary)  -     MRI Lumbar Spine Without Contrast; Future  -     XR Spine Lumbar  Complete With Flex & Ext; Future    2. Chronic bilateral low back pain without sciatica    Other orders  -     methylPREDNISolone (MEDROL) 4 MG dose pack; Take as directed on package instructions.  Dispense: 1 each; Refill: 0      Return for Recheck.    This patient was examined wearing appropriate personal protective equipment.     Latasha Ruiz  reports that she has never smoked. She has never been exposed to tobacco smoke. She has never used smokeless tobacco.      Body mass index is 46.6 kg/m².  Class 3 Severe Obesity (BMI >=40). Obesity-related health conditions include the following: ........ Obesity is unchanged. BMI is above average; BMI management plan is completed. We discussed portion control and increasing exercise.     Patient's blood pressure was reviewed.  Recommendations for  a low-salt diet and exercise to maintain/improve BP in addition to taking any presribed medications.    Advance Care Planning   ACP discussion was held with the patient during this visit. Patient has an advance directive (not in EMR), copy requested.             Martha Winchester DNP, APRN    07/31/25  09:29 EDT

## 2025-07-31 ENCOUNTER — OFFICE VISIT (OUTPATIENT)
Dept: NEUROSURGERY | Facility: CLINIC | Age: 65
End: 2025-07-31
Payer: MEDICARE

## 2025-07-31 VITALS
HEART RATE: 90 BPM | BODY MASS INDEX: 46.6 KG/M2 | RESPIRATION RATE: 18 BRPM | OXYGEN SATURATION: 98 % | SYSTOLIC BLOOD PRESSURE: 170 MMHG | DIASTOLIC BLOOD PRESSURE: 110 MMHG | HEIGHT: 63 IN | WEIGHT: 263 LBS

## 2025-07-31 DIAGNOSIS — R29.818 NEUROGENIC CLAUDICATION: Primary | ICD-10-CM

## 2025-07-31 DIAGNOSIS — M54.50 CHRONIC BILATERAL LOW BACK PAIN WITHOUT SCIATICA: ICD-10-CM

## 2025-07-31 DIAGNOSIS — G89.29 CHRONIC BILATERAL LOW BACK PAIN WITHOUT SCIATICA: ICD-10-CM

## 2025-07-31 PROCEDURE — 99214 OFFICE O/P EST MOD 30 MIN: CPT | Performed by: NURSE PRACTITIONER

## 2025-07-31 PROCEDURE — 1160F RVW MEDS BY RX/DR IN RCRD: CPT | Performed by: NURSE PRACTITIONER

## 2025-07-31 PROCEDURE — 1159F MED LIST DOCD IN RCRD: CPT | Performed by: NURSE PRACTITIONER

## 2025-07-31 RX ORDER — METHYLPREDNISOLONE 4 MG/1
TABLET ORAL
Qty: 1 EACH | Refills: 0 | Status: SHIPPED | OUTPATIENT
Start: 2025-07-31

## 2025-08-18 ENCOUNTER — HOSPITAL ENCOUNTER (OUTPATIENT)
Dept: MRI IMAGING | Facility: HOSPITAL | Age: 65
Discharge: HOME | End: 2025-08-18
Payer: MEDICARE

## 2025-08-18 ENCOUNTER — HOSPITAL ENCOUNTER (OUTPATIENT)
Dept: GENERAL RADIOLOGY | Facility: HOSPITAL | Age: 65
Discharge: HOME | End: 2025-08-18
Payer: MEDICARE

## 2025-08-18 DIAGNOSIS — R29.818 NEUROGENIC CLAUDICATION: ICD-10-CM

## 2025-08-18 PROCEDURE — 72114 X-RAY EXAM L-S SPINE BENDING: CPT

## 2025-08-18 PROCEDURE — 72148 MRI LUMBAR SPINE W/O DYE: CPT

## 2025-08-27 ENCOUNTER — OFFICE VISIT (OUTPATIENT)
Dept: NEUROSURGERY | Facility: CLINIC | Age: 65
End: 2025-08-27
Payer: MEDICARE

## 2025-08-27 VITALS — OXYGEN SATURATION: 97 % | BODY MASS INDEX: 46.6 KG/M2 | HEIGHT: 63 IN | HEART RATE: 60 BPM | RESPIRATION RATE: 18 BRPM

## 2025-08-27 DIAGNOSIS — M43.10 ACQUIRED SPONDYLOLISTHESIS: ICD-10-CM

## 2025-08-27 DIAGNOSIS — M48.062 SPINAL STENOSIS, LUMBAR REGION, WITH NEUROGENIC CLAUDICATION: Primary | ICD-10-CM

## 2025-08-27 RX ORDER — GABAPENTIN 100 MG/1
100 CAPSULE ORAL NIGHTLY
Qty: 30 CAPSULE | Refills: 0 | Status: SHIPPED | OUTPATIENT
Start: 2025-08-27